# Patient Record
(demographics unavailable — no encounter records)

---

## 2017-02-08 NOTE — EKG REPORT
SEVERITY:- ABNORMAL ECG -

ATRIAL FIBRILLATION WITH RVR.

REPOLARIZATION ABNORMALITY, PROB RATE RELATED

:

Confirmed by: Tawanda Ramirez MD 08-Feb-2017 13:46:54

## 2017-02-08 NOTE — ER DOCUMENT REPORT
ED General





- General


Chief Complaint: Chest Pain


Stated Complaint: CHEST PAIN


TRAVEL OUTSIDE OF THE [place] IN LAST 30 DAYS: No





- HPI


Patient complains to provider of: chest pain palpitations weakness


Notes: 


Patient coming in for evaluation of chest pain weakness ongoing for last 2 

weeks.  Patient also complains of right knee swelling and pain.  Denies any 

trauma.  Patient states no history diabetes states history of hypertension.  

Denies any recent travel denies any recent antibiotics.  Upon triage patient 

was found to have a heart rate of 170.  Patient denies any dizziness or blurred 

vision feeling faint denies any syncopal episode recently





- Related Data


Allergies/Adverse Reactions: 


 





tramadol Allergy (Intermediate, Verified 02/08/17 13:42)


 AMS


Penicillins Allergy (Mild, Verified 02/08/17 13:42)


 rash


Sulfa (Sulfonamide Antibiotics) Allergy (Mild, Verified 02/08/17 13:42)


 rash


aspirin Allergy (Verified 02/08/17 13:42)


 








Home Medications: 


 Current Home Medications





Albuterol Sulfate [Ventolin HFA MDI 18 GM] 2 puff IH TIDP PRN 02/08/17 [History]


Alprazolam [Xanax] 1 mg PO TIDP PRN 02/08/17 [History]


Amitriptyline HCl [Elavil 25 mg Tablet] 50 mg PO QHS 02/08/17 [History]


Ammonium Lactate [Lac-Hydrin 12% Lotion 225Gm/Bottle] 1 applic TOP BIDP PRN 02/ 08/17 [History]


Brexpiprazole [Rexulti] 3 mg PO QHS 02/08/17 [History]


Clobetasol Propionate [Temovate 0.05% Cream 15 gm] 1 applic TOP BIDP PRN 02/08/ 17 [History]


Cyclobenzaprine HCl [Flexeril 10 mg Tablet] 10 mg PO TID 02/08/17 [History]


Desvenlafaxine Succinate [Pristiq  mg Tab.sr] 100 mg PO DAILY 02/08/17 [

History]


Dexlansoprazole [Dexilant 60 mg Capsule] 60 mg PO DAILY 02/08/17 [History]


Diphenhydramine HCl [Benadryl 25 mg Capsule] 50 mg PO QHS 02/08/17 [History]


Fluticasone/Salmeterol [Advair 250-50 Diskus 28 dose] 1 puff IH DAILY 02/08/17 [

History]


Levothyroxine Sodium [Synthroid 0.05 mg Tablet] 50 mcg PO DAILY 02/08/17 [

History]


Metformin HCl [Glucophage] 500 mg PO BID 02/08/17 [History]


Simvastatin [Zocor 40 mg Tablet] 40 mg PO QPM 02/08/17 [History]


Zolpidem Tartrate [Ambien] 10 mg PO QHS 02/08/17 [History]











Past Medical History





- Social History


Smoking Status: Unknown if Ever Smoked


Family History: CAD, CVA, DM, Hyperlipidemia, Hypertension, Malignancy, Thyroid 

Disfunction, Other - asthma


Patient has suicidal ideation: No


Patient has homicidal ideation: No





- Past Medical History


Cardiac Medical History: Reports: Hx Hypercholesterolemia


   Denies: Hx Coronary Artery Disease, Hx Heart Attack, Hx Hypertension


Pulmonary Medical History: Reports: Hx Asthma, Hx Bronchitis


   Denies: Hx COPD, Hx Pneumonia


Neurological Medical History: Denies: Hx Cerebrovascular Accident, Hx Seizures


Endocrine Medical History: Reports: Hx Diabetes Mellitus Type 2 - borderline, 

Hx Hypothyroidism


Renal/ Medical History: Denies: Hx Peritoneal Dialysis


GI Medical History: Reports: Hx Gastritis, Hx Gastroesophageal Reflux Disease


Musculoskeltal Medical History: Reports Hx Arthritis, Reports Hx 

Musculoskeletal Trauma


Psychiatric Medical History: Reports: Hx Anxiety, Hx Depression - anxiety, Hx 

Schizophrenia


Past Surgical History: Reports: Hx Cholecystectomy, Hx Tubal Ligation.  Denies: 

Hx Hysterectomy





- Immunizations


Immunizations up to date: Yes


Hx Diphtheria, Pertussis, Tetanus Vaccination: Yes





Review of Systems





- Review of Systems


Constitutional: No symptoms reported


EENT: No symptoms reported


Cardiovascular: Chest pain


Respiratory: Cough


Gastrointestinal: No symptoms reported


Genitourinary: No symptoms reported


Female Genitourinary: No symptoms reported


Musculoskeletal: No symptoms reported


Skin: No symptoms reported


Hematologic/Lymphatic: No symptoms reported


Neurological/Psychological: No symptoms reported


-: Yes All other systems reviewed and negative





Physical Exam





- Vital signs


Vitals: 


 











Pulse Ox


 


 98 


 


 02/08/17 13:05











Interpretation: Tachycardic





- General


General appearance: Appears well, Alert





- HEENT


Head: Normocephalic, Atraumatic


Eyes: Normal


Pupils: PERRL





- Respiratory


Respiratory status: No respiratory distress


Chest status: Nontender


Breath sounds: Normal


Chest palpation: Normal





- Cardiovascular


Rhythm: Irregularly irregular, Tachycardia


Heart sounds: Normal auscultation


Murmur: No





- Abdominal


Inspection: Normal


Distension: No distension


Bowel sounds: Normal


Tenderness: Nontender


Organomegaly: No organomegaly





- Back


Back: Normal, Nontender





- Extremities


General upper extremity: Normal inspection, Nontender, Normal color, Normal ROM

, Normal temperature


General lower extremity: Normal inspection, Tender - Right knee, Edema - Right 

knee, Normal color, Normal ROM, Normal temperature, Normal weight bearing.  No: 

Eliane's sign





- Neurological


Neuro grossly intact: Yes


Cognition: Normal


Orientation: AAOx4


Chloe Coma Scale Eye Opening: Spontaneous


[place] Coma Scale Verbal: Oriented


Chloe Coma Scale Motor: Obeys Commands


Chloe Coma Scale Total: 15


Speech: Normal


Motor strength normal: LUE, RUE, LLE, RLE


Sensory: Normal





- Psychological


Associated symptoms: Normal affect, Normal mood





- Skin


Skin Temperature: Warm


Skin Moisture: Dry


Skin Color: Normal





Course





- Re-evaluation


Re-evalutation: 





02/08/17 19:06


Patient coming in for evaluation of atrial evaluation.  Patient was started on 

Cardizem and did have a good response improve her A. fib with RVR.  Patient 

also underwent a CTA no PE.  Patient's lab work showed no acute etiology except 

for have a magnesium.  Patient will be referred to hospitals for further 

evaluation and admission.





- Vital Signs


Vital signs: 


 











Temp Pulse Resp BP Pulse Ox


 


 98.2 F      17   115/91 H  96 


 


 02/08/17 14:42     02/08/17 18:46  02/08/17 18:46  02/08/17 18:46














- Laboratory


Result Diagrams: 


 02/08/17 13:10





 02/08/17 13:10


Laboratory results interpreted by me: 


 











  02/08/17 02/08/17 02/08/17





  13:10 13:10 13:10


 


Lymphocytes % (Manual)  46 H  


 


BUN   6 L 


 


Glucose   185 H 


 


Magnesium   1.4 L 


 


Free T4    0.64 L














Critical Care Note





- Critical Care Note


Total time excluding time spent on procedures (mins): 35


Comments: 


Patient with A. fib RVR time spent managing drip





Discharge





- Discharge


Clinical Impression: 


 New onset a-fib, Hypomagnesemia





Chest pain


Qualifiers:


 Chest pain type: unspecified Qualified Code(s): R07.9 - Chest pain, unspecified





Right knee pain


Qualifiers:


 Chronicity: acute Qualified Code(s): M25.561 - Pain in right knee





Diabetes


Qualifiers:


 Diabetes mellitus type: type 2 Diabetes mellitus complication status: without 

complication Diabetes mellitus long term insulin use: without long term use 

Qualified Code(s): E11.9 - Type 2 diabetes mellitus without complications





Disposition: ADMITTED AS INPATIENT


Admitting Provider: Hospitalist - Mark


Unit Admitted: [facility]

## 2017-02-08 NOTE — PDOC CONSULTATION
Consultation


Consult Date: 17


Attending physician:: ROSA ELENA KELLY


Consult reason:: Atrial fibrillation, chest pain





History of Present Illness


Admission Date/PCP: 


  17 17:26





  SCOOTER REZA MD





Patient complains of: Palpitations and chest pains


History of Present Illness: 


EVERARDO GEORGE is a 50 year old female with several month history of 

palpitations/heart racing and associated chest and right arm pain.  Patient has 

also noted over the same period of time swelling in her legs and weight gain.  

In addition patient complains of several day history of right knee pain.  She 

has no history of injury to this area.  She has no history of arthritis or gout.





Patient describes the chest discomfort as sharp, central chest, radiating to 

the right arm.  The discomfort is rather constant.  There is no aggravating 

factor that she has noted.  There are no other associated symptoms such as 

nausea vomiting diaphoresis.  Patient does feel palpitations when she has chest 

pain.  The discomfort is acute in onset.  The discomfort has improved since 

hospitalization.





Patient was noted to be in atrial fibrillation with rapid ventricular response 

in the emergency department.  She has now converted to a sinus rhythm after 

being administer Cardizem.  She is currently on Cardizem drip at 10 mg per hour.





Patient denied any history of prior heart problems but does give strong family 

history of premature coronary artery disease.  Her mother apparently  in 

hospital at the age of 53 of sudden cardiac death.


Patient does give history of loud snoring, daytime fatigue and sleepiness.  

Patient also has difficulty falling asleep and staying asleep.





Past Medical History


Cardiac Medical History: Reports: Hyperlipidema


   Denies: Coronary Artery Disease, Myocardial Infarction, Hypertension


Pulmonary Medical History: Reports: Asthma, Bronchitis


   Denies: Chronic Obstructive Pulmonary Disease (COPD), Pneumonia


Neurological Medical History: 


   Denies: Seizures


Endocrine Medical History: Reports: Diabetes Mellitus Type 2 - borderline, 

Hypothyroidism


GI Medical History: Reports: Gastroesophageal Reflux Disease


Musculoskeltal Medical History: Reports: Arthritis


Psychiatric Medical History: Reports: Depression - anxiety


Hematology: 


   Denies: Anemia





Past Surgical History


Past Surgical History: Reports: Cholecystectomy, Tubal Ligation


   Denies: Hysterectomy





Social History


Information Source: Patient


Smoking Status: Unknown if Ever Smoked


Frequency of Alcohol Use: None


Hx Recreational Drug Use: No


Hx Prescription Drug Abuse: No





- Advance Directive


Resuscitation Status: Full Code


Surrogate healthcare decision maker:: 


Patient eldest son by the name of Angel





Family History


Family History: CAD, CVA, DM, Hyperlipidemia, Hypertension, Malignancy, Thyroid 

Disfunction, Other - asthma


Parental Family History Reviewed: Yes


Children Family History Reviewed: Yes


Sibling(s) Family History Reviewed.: Yes - Positive for premature CAD in the 

family





Medication/Allergy


Home Medications: 








Alprazolam [Xanax] 1 mg PO TIDP PRN 17 


Amitriptyline HCl [Elavil 25 mg Tablet] 50 mg PO QHS 17 


Ammonium Lactate [Lac-Hydrin 12% Lotion 225Gm/Bottle] 1 applic TOP BIDP PRN  


Brexpiprazole [Rexulti] 3 mg PO QHS 17 


Clobetasol Propionate [Temovate 0.05% Cream 15 gm] 1 applic TOP BIDP PRN  


Dexlansoprazole [Dexilant 60 mg Capsule] 60 mg PO DAILY 17 


Diphenhydramine HCl [Benadryl 25 mg Capsule] 50 mg PO QHS 17 


Fluticasone/Salmeterol [Advair 250-50 Diskus 28 dose] 1 puff IH BID 17 


Levothyroxine Sodium [Synthroid 0.05 mg Tablet] 50 mcg PO QAM 17 


Metformin HCl [Glucophage] 500 mg PO BID 17 


Simvastatin [Zocor 40 mg Tablet] 40 mg PO QPM 17 


Zolpidem Tartrate [Ambien] 10 mg PO QHS 17 


Desvenlafaxine Succinate [Pristiq  mg Tab.sr] 100 mg PO DAILY 17 








Allergies/Adverse Reactions: 


 





tramadol Allergy (Intermediate, Verified 17 13:42)


 AMS


Penicillins Allergy (Mild, Verified 17 13:42)


 rash


Sulfa (Sulfonamide Antibiotics) Allergy (Mild, Verified 17 13:42)


 rash


aspirin Allergy (Verified 17 13:42)


 











Review of Systems


Review of Systems: 


Please see history of present illness and past medical history as wall.


Constitutional: No fever or chills reported.


Head : No recent chronic headaches, recent head injury.


Eyes: No recent eye pain, diplopia, redness, discharge, acute visual changes.


Ears: No recent chronic ear pain, acute hearing loss, ear discharge.


Oral cavity: No recent  ulcerations, bleeding, oral cavity discomfort.


Neck: No recent acute neck pain reported.


Hematologic: No recent easy bruising or bleeding or hematologic malignancy 

reported.


Lymphatic: No recent lymphatic malignancy, chronic lymphadenopathy reported yet


Cardiovascular system review: See history of present illness.


Respiratory system review: No recent chronic cough, hemoptysis, blood clots in 

the lungs reported. Mild Shortness of breath on exertion


Gastrointestinal system review: Negative for any recent acute or chronic 

abdominal pain, hematemesis, melena, recent change in bowel habits.  


Genitourinary system review: No recent acute or chronic hematuria, flank pain, 

UTI etc. reported.


Skin system review: Negative for any recent abnormal bruising, no rash, no 

pruritus reported.


Neurologic: No prior history of strokes, mini strokes, seizure disorder.


Psychologic: No history of major psychosis or major depression reported.  

Patient gives history of minor depression


Musculoskeletal: Minor aches and pains reported.  No acute joint swelling 

reported.


Endocrine: No recent polyuria, polydipsia, recent heat or cold intolerance.





Physical Exam


Vital Signs: 


 











Temp Pulse Resp BP Pulse Ox


 


 98.2 F      17   115/91 H  96 


 


 17 14:42     17 18:46  17 18:46  17 18:46











Exam: 





GENERAL: well-nourished and in no acute distress.  Alert and oriented x3


HEAD: Atraumatic, normocephalic.


EYES: Pupils equal round and reactive to light, extraocular movements intact, 

sclera anicteric, conjunctiva are normal.


ENT: TMs normal, nares patent, oropharynx clear without exudates. Moist mucous 

membranes.  No oral ulcerations or bleeding gums noted


NECK: supple without lymphadenopathy.  Trachea is central.  No cervical or 

axillary lymphadenopathy noted.  Carotids are 2+, JVD WNL 


LUNGS: Respiration seems nonlabored, no significant accessory muscle action 

noted.  Breath sounds clear to auscultation bilaterally and equal. No wheezes 

rales or rhonchi.  No significant dullness noted on percussion.


CHEST: Palpation of the chest wall shows no significant chest wall tenderness 

or abnormalities.


HEART: Pinch NP, No PSH,  1/6 LARRY aortic area, 1/6 pan systolic murmur mitral 

area, no rubs, no gallops.


ABDOMEN: Soft, no significant tenderness appreciated, normoactive bowel sounds. 

No guarding, no rebound.  No rigidity noted . No masses appreciated.


EXTREMITIES: Pedal pulses are 1-2+, no calf tenderness noted. No clubbing or 

cyanosis.trace to 1+ pedal edema noted


NEUROLOGICAL: Focused neurological exam showed no significant neurologic 

deficit. Normal speech, no focal weakness appreciated. 


PSYCH: Normal mood, normal affect.  Judgment and insight within normal limits.


SKIN: No significant ecchymosis, rash, ulcerations or signs of pruritus noted.


MUSCULOSKELETAL EXAM: No significant joint swelling noted.  





Results


EKG Comments: 


Initial EKG showed atrial fibrillation with rapid ventricular response at 170 

bpm.  Subsequent EKG shows sinus rhythm and nonspecific T-wave inversion 

inferior and lateral chest leads.


Impressions: 


 





Chest X-Ray  17 12:56


IMPRESSION:  NO ACUTE RADIOGRAPHIC FINDING IN THE CHEST.


 








Knee X-Ray  17 12:58


IMPRESSION:  NEGATIVE STUDY OF THE RIGHT KNEE. NO EXPLANATION FOR PAIN.


 








Chest/Abdomen CTA  17 14:17


IMPRESSION:  No acute consolidations or pleural effusions are identified. NO 

PULMONARY EMBOLI.  Other findings as noted above


 














Assessment & Plan





- Diagnosis


(1) Paroxysmal atrial fibrillation


Is this a current diagnosis for this admission?: Yes





(2) Obesity


Qualifiers: 


     Obesity severity: unspecified obesity severity


Is this a current diagnosis for this admission?: Yes





(3) Sleep disorder breathing


Is this a current diagnosis for this admission?: Yes





(4) Chest pain


Qualifiers: 


     Chest pain type: unspecified     Qualified Code(s): R07.9 - Chest pain, 

unspecified  


Is this a current diagnosis for this admission?: Yes





(5) Diabetes


Qualifiers: 


     Diabetes mellitus type: type 2     Diabetes mellitus complication status: 

without complication     Diabetes mellitus long term insulin use: without long 

term use        Qualified Code(s): E11.9 - Type 2 diabetes mellitus without 

complications  


Is this a current diagnosis for this admission?: Yes








- Notes


Notes: 


Paroxysmal atrial fibrillation: Patient gives history of intermittent irregular 

heartbeat.  This is most likely related to obesity, sleep apnea syndrome.  

Discussed association of both obesity and sleep apnea syndrome with atrial 

fibrillation.  Patient encouraged in weight loss.  Patient will also benefit 

from scheduling a sleep study.  Do not feel chronic long-term anticoagulation 

is needed at this point but patient will be considered for ablation therapy 

should patient has recurrence of atrial fibrillation.  Patient New score is 

noted to be 1.


Obesity Discussed adverse effect of overweight/obesity on cardiovascular event 

rate, sleep apnea, diabetes and hypertension et cetera.  Patient has been 

recommended weight loss.


Sleep disordered breathing: Based on patient's symptoms, oropharyngeal exam, 

body habitus, comorbid diagnosis etc., there is high probability of underlying 

sleep apnea syndrome.  Evaluation is recommended for sleep apnea as treatment 

of this condition if found is likely to benefit patient and reduce patient's 

future cardiovascular risk.


Chest pain: Patient has some typical and atypical features of chest pain.  

Cardiac enzymes so far has been negative.  Electrocardiogram did not show any 

definitive ST segment changes.  Multiple differential diagnoses exist in this 

patient. In descending order of probability this includes underlying coronary 

artery disease, gastroesophageal reflux, musculoskeletal pain, referred pain 

from elsewhere, anxiety panic disorder etc.Patient has significant cardiac risk 

factors, which indicates that there is a intermediate probability of chest 

discomfort coming from underlying CAD.  Feel that it would need to be evaluated 

further.  Discussed evaluation to assess this.  In this regard risk benefits of 

nuclear stress test and other alternative processes were discussed in detail.  

The patient prefers to undergo nuclear stress test.  The small risk of radiation

, myocardial infarction, death, cardiac arrhythmias, respiratory distress etc. 

were discussed.  Patient understood the risks and gave informed consent.  

Nuclear stress test was therefore scheduled.  For risk evaluation, patient is 

also being scheduled for a 2-D echocardiogram.  Patient questions were answered.


Diabetes:Recommend good control of blood sugar.  However should avoid any 

hypoglycemia.  Patient being expertly managed by primary care M.D.





- Time


Time Spent: 30 to 50 Minutes - CODE STATUS was discussed, patient remains full 

code.  Surrogate decision-maker patient's eldest son.  Multiple medical 

problems were addressed.More than 50% of the time spent coordinating care, 

discussing management plans with involved caregivers.  Management plans 

discussed with involved personnels.  Medical decision making was of moderate 

complexity.

## 2017-02-08 NOTE — PDOC H&P
History of Present Illness


Admission Date/PCP: 


  02/08/17 16:50





  SCOOTER REZA MD





Patient complains of: Chest pain


History of Present Illness: 


EVERARDO GEORGE is a 50 year old female with several month history of 

palpitations/heart racing and associated chest and right arm pain.  Patient has 

also noted over the same period of time swelling in her legs and weight gain.  

Patient drinks excessive amounts of fluids and seems to fixate on this when I 

talk about a fluid restriction.  In addition patient complains of several day 

history of right knee pain.  She has no injury to this area.  She has no 

history of arthritis or gout.





Medications have not been verified at the time of this dictation.  Patient 

states that she gets medications filled at [facility] pharmacy on [facility].  

She states that she takes metformin, "a fluid pill", and other medications that 

she does not recall.








Patient was noted to be in atrial fibrillation with rapid ventricular response 

in the emergency department.  She has now converted to a sinus rhythm after 

being administer Cardizem.  She is currently on Cardizem drip at 10 mg per hour.





Past Medical History


Cardiac Medical History: Reports: Hyperlipidema


   Denies: Coronary Artery Disease, Myocardial Infarction, Hypertension


Pulmonary Medical History: Reports: Asthma, Bronchitis


   Denies: Chronic Obstructive Pulmonary Disease (COPD), Pneumonia


Neurological Medical History: 


   Denies: Seizures


Endocrine Medical History: Reports: Diabetes Mellitus Type 2 - borderline, 

Hypothyroidism


GI Medical History: Reports: Gastroesophageal Reflux Disease


Musculoskeltal Medical History: Reports: Arthritis


Psychiatric Medical History: Reports: Depression - anxiety


Hematology: 


   Denies: Anemia





Past Surgical History


Past Surgical History: Reports: Cholecystectomy, Tubal Ligation


   Denies: Hysterectomy





Social History


Information Source: Patient


Smoking Status: Unknown if Ever Smoked


Frequency of Alcohol Use: None


Hx Recreational Drug Use: No


Hx Prescription Drug Abuse: No





- Advance Directive


Resuscitation Status: Full Code





Family History


Family History: CAD, CVA, DM, Hyperlipidemia, Hypertension, Malignancy, Thyroid 

Disfunction, Other - asthma


Parental Family History Reviewed: Yes


Children Family History Reviewed: Yes


Sibling(s) Family History Reviewed.: Yes





Medication/Allergy


Home Medications: 








Albuterol Sulfate [Ventolin HFA MDI 18 GM] 2 puff IH TIDP PRN 02/08/17 


Alprazolam [Xanax] 1 mg PO TIDP PRN 02/08/17 


Amitriptyline HCl [Elavil 25 mg Tablet] 50 mg PO QHS 02/08/17 


Ammonium Lactate [Lac-Hydrin 12% Lotion 225Gm/Bottle] 1 applic TOP BIDP PRN 02/ 08/17 


Brexpiprazole [Rexulti] 3 mg PO QHS 02/08/17 


Clobetasol Propionate [Temovate 0.05% Cream 15 gm] 1 applic TOP BIDP PRN 02/08/ 17 


Cyclobenzaprine HCl [Flexeril 10 mg Tablet] 10 mg PO TID 02/08/17 


Desvenlafaxine Succinate [Pristiq  mg Tab.sr] 100 mg PO DAILY 02/08/17 


Dexlansoprazole [Dexilant 60 mg Capsule] 60 mg PO DAILY 02/08/17 


Diphenhydramine HCl [Benadryl 25 mg Capsule] 50 mg PO QHS 02/08/17 


Fluticasone/Salmeterol [Advair 250-50 Diskus 28 dose] 1 puff IH DAILY 02/08/17 


Levothyroxine Sodium [Synthroid 0.05 mg Tablet] 50 mcg PO DAILY 02/08/17 


Metformin HCl [Glucophage] 500 mg PO BID 02/08/17 


Simvastatin [Zocor 40 mg Tablet] 40 mg PO QPM 02/08/17 


Zolpidem Tartrate [Ambien] 10 mg PO QHS 02/08/17 








Allergies/Adverse Reactions: 


 





tramadol Allergy (Intermediate, Verified 02/08/17 13:42)


 AMS


Penicillins Allergy (Mild, Verified 02/08/17 13:42)


 rash


Sulfa (Sulfonamide Antibiotics) Allergy (Mild, Verified 02/08/17 13:42)


 rash


aspirin Allergy (Verified 02/08/17 13:42)


 











Review of Systems


Constitutional: PRESENT: fatigue, weight gain.  ABSENT: chills, fever(s), 

headache(s), weight loss


Eyes: ABSENT: visual disturbances


Ears: ABSENT: hearing changes


Cardiovascular: PRESENT: chest pain, edema.  ABSENT: dyspnea on exertion, 

orthropnea, palpitations


Respiratory: ABSENT: cough, hemoptysis


Gastrointestinal: ABSENT: abdominal pain, constipation, diarrhea, hematemesis, 

hematochezia, nausea, vomiting


Genitourinary: ABSENT: dysuria, hematuria


Musculoskeletal: ABSENT: joint swelling


Integumentary: ABSENT: rash, wounds


Neurological: ABSENT: abnormal gait, abnormal speech, confusion, dizziness, 

focal weakness, syncope


Psychiatric: ABSENT: anxiety, depression, homidical ideation, suicidal ideation


Endocrine: PRESENT: polydipsia.  ABSENT: cold intolerance, heat intolerance, 

polyuria


Hematologic/Lymphatic: ABSENT: easy bleeding, easy bruising





Physical Exam


Vital Signs: 


 











Temp Pulse Resp BP Pulse Ox


 


 98.2 F      21 H  138/80 H  99 


 


 02/08/17 14:42     02/08/17 16:31  02/08/17 16:31  02/08/17 16:31








PHYSICAL EXAM:





GENERAL: Appears well, no acute distress            


HEENT: Normocephalic, no scleral icterus, conjunctiva clear, EOEM intact, PERRLA

, moist mucous membranes, edentulous            


NECK: trachea midline, no thyromegally            


RESPIRATORY: Clear to auscultation, no wheezes/rhonchi


CARDIAC: Regular rate and rhythm, no murmur/pippa/rub         


ABDOMEN: Soft, no distension, no tenderness, no guarding, normal bowel sounds, 

negative Saldivar sign         


RECTAL: deferred


: deferred


EXTREMITIES: 1+ bilateral lower extremity edema


MUSCULOSKELETAL: No joint swelling or deformity


VASCULAR: normal peripheral pulses


NEUROLOGIC: Alert, oriented to person/place/time, normal speech, cranial nerves 

grossly intact, 5/5 strength in all extremities, tactile sensation intact in 

all extremities


SKIN: No rash, no wounds, no worrisome skin lesions


PSYCHIATRIC: Normal mood, unusual affect





Results


Laboratory Results: 


 Labs- All tests 24 hr











  02/08/17 02/08/17 02/08/17





  13:10 13:10 13:10


 


WBC  7.9  


 


RBC  4.30  


 


Hgb  13.2  


 


Hct  40.9  


 


MCV  95  


 


MCH  30.8  


 


MCHC  32.4  


 


RDW  12.9  


 


Plt Count  338  


 


Total Counted  100  


 


Seg Neutrophils %  Not Reportable  


 


Seg Neuts % (Manual)  45  


 


Lymphocytes %  Not Reportable  


 


Lymphocytes % (Manual)  46 H  


 


Atypical Lymphs %  1  


 


Monocytes %  Not Reportable  


 


Monocytes % (Manual)  8  


 


Eosinophils %  Not Reportable  


 


Eosinophils % (Manual)  0  


 


Basophils %  Not Reportable  


 


Basophils % (Manual)  0  


 


Absolute Neutrophils  Not Reportable  


 


Abs Neuts (Manual)  3.6  


 


Absolute Lymphocytes  Not Reportable  


 


Abs Lymphs (Manual)  3.7  


 


Absolute Monocytes  Not Reportable  


 


Abs Monocytes (Manual)  0.6  


 


Absolute Eosinophils  Not Reportable  


 


Absolute Eos (Manual)  0.0  


 


Absolute Basophils  Not Reportable  


 


Abs Basophils (Manual)  0.0  


 


Platelet Comment  ADEQUATE  


 


Polychromasia  SLIGHT  


 


Hypochromasia  SLIGHT  


 


PT   12.0 


 


INR   0.86 


 


Sodium    139.9


 


Potassium    3.9


 


Chloride    103


 


Carbon Dioxide    23


 


Anion Gap    14


 


BUN    6 L


 


Creatinine    0.87


 


Est GFR ( Amer)    > 60


 


Est GFR (Non-Af Amer)    > 60


 


Glucose    185 H


 


Calcium    9.5


 


Magnesium    1.4 L


 


Total Bilirubin    0.5


 


Direct Bilirubin    0.0


 


AST    31


 


ALT    28


 


Alkaline Phosphatase    105


 


Creatine Kinase    110


 


CK-MB (CK-2)   


 


Troponin I   


 


Total Protein    7.1


 


Albumin    3.9


 


TSH   


 


Free T4   


 


Urine Color   


 


Urine Appearance   


 


Urine pH   


 


Ur Specific Gravity   


 


Urine Protein   


 


Urine Glucose (UA)   


 


Urine Ketones   


 


Urine Blood   


 


Urine Nitrite   


 


Urine Bilirubin   


 


Urine Urobilinogen   


 


Ur Leukocyte Esterase   


 


Urine WBC (Auto)   


 


Urine Bacteria (Auto)   


 


Squamous Epi Cells Auto   


 


Urine Ascorbic Acid   


 


Urine Opiates Screen   


 


Urine Methadone Screen   


 


Ur Barbiturates Screen   


 


Ur Phencyclidine Scrn   


 


Ur Amphetamines Screen   


 


U Benzodiazepines Scrn   


 


Urine Cocaine Screen   


 


U Marijuana (THC) Screen   














  02/08/17 02/08/17 02/08/17





  13:10 13:10 16:00


 


WBC   


 


RBC   


 


Hgb   


 


Hct   


 


MCV   


 


MCH   


 


MCHC   


 


RDW   


 


Plt Count   


 


Total Counted   


 


Seg Neutrophils %   


 


Seg Neuts % (Manual)   


 


Lymphocytes %   


 


Lymphocytes % (Manual)   


 


Atypical Lymphs %   


 


Monocytes %   


 


Monocytes % (Manual)   


 


Eosinophils %   


 


Eosinophils % (Manual)   


 


Basophils %   


 


Basophils % (Manual)   


 


Absolute Neutrophils   


 


Abs Neuts (Manual)   


 


Absolute Lymphocytes   


 


Abs Lymphs (Manual)   


 


Absolute Monocytes   


 


Abs Monocytes (Manual)   


 


Absolute Eosinophils   


 


Absolute Eos (Manual)   


 


Absolute Basophils   


 


Abs Basophils (Manual)   


 


Platelet Comment   


 


Polychromasia   


 


Hypochromasia   


 


PT   


 


INR   


 


Sodium   


 


Potassium   


 


Chloride   


 


Carbon Dioxide   


 


Anion Gap   


 


BUN   


 


Creatinine   


 


Est GFR ( Amer)   


 


Est GFR (Non-Af Amer)   


 


Glucose   


 


Calcium   


 


Magnesium   


 


Total Bilirubin   


 


Direct Bilirubin   


 


AST   


 


ALT   


 


Alkaline Phosphatase   


 


Creatine Kinase   


 


CK-MB (CK-2)  0.56  


 


Troponin I  < 0.012  


 


Total Protein   


 


Albumin   


 


TSH   4.29 


 


Free T4   0.64 L 


 


Urine Color    STRAW


 


Urine Appearance    CLEAR


 


Urine pH    6.0


 


Ur Specific Gravity    1.002


 


Urine Protein    NEGATIVE


 


Urine Glucose (UA)    NEGATIVE


 


Urine Ketones    NEGATIVE


 


Urine Blood    NEGATIVE


 


Urine Nitrite    NEGATIVE


 


Urine Bilirubin    NEGATIVE


 


Urine Urobilinogen    NEGATIVE


 


Ur Leukocyte Esterase    NEGATIVE


 


Urine WBC (Auto)    1


 


Urine Bacteria (Auto)    TRACE


 


Squamous Epi Cells Auto    3


 


Urine Ascorbic Acid    NEGATIVE


 


Urine Opiates Screen   


 


Urine Methadone Screen   


 


Ur Barbiturates Screen   


 


Ur Phencyclidine Scrn   


 


Ur Amphetamines Screen   


 


U Benzodiazepines Scrn   


 


Urine Cocaine Screen   


 


U Marijuana (THC) Screen   














  02/08/17





  16:00


 


WBC 


 


RBC 


 


Hgb 


 


Hct 


 


MCV 


 


MCH 


 


MCHC 


 


RDW 


 


Plt Count 


 


Total Counted 


 


Seg Neutrophils % 


 


Seg Neuts % (Manual) 


 


Lymphocytes % 


 


Lymphocytes % (Manual) 


 


Atypical Lymphs % 


 


Monocytes % 


 


Monocytes % (Manual) 


 


Eosinophils % 


 


Eosinophils % (Manual) 


 


Basophils % 


 


Basophils % (Manual) 


 


Absolute Neutrophils 


 


Abs Neuts (Manual) 


 


Absolute Lymphocytes 


 


Abs Lymphs (Manual) 


 


Absolute Monocytes 


 


Abs Monocytes (Manual) 


 


Absolute Eosinophils 


 


Absolute Eos (Manual) 


 


Absolute Basophils 


 


Abs Basophils (Manual) 


 


Platelet Comment 


 


Polychromasia 


 


Hypochromasia 


 


PT 


 


INR 


 


Sodium 


 


Potassium 


 


Chloride 


 


Carbon Dioxide 


 


Anion Gap 


 


BUN 


 


Creatinine 


 


Est GFR ( Amer) 


 


Est GFR (Non-Af Amer) 


 


Glucose 


 


Calcium 


 


Magnesium 


 


Total Bilirubin 


 


Direct Bilirubin 


 


AST 


 


ALT 


 


Alkaline Phosphatase 


 


Creatine Kinase 


 


CK-MB (CK-2) 


 


Troponin I 


 


Total Protein 


 


Albumin 


 


TSH 


 


Free T4 


 


Urine Color 


 


Urine Appearance 


 


Urine pH 


 


Ur Specific Gravity 


 


Urine Protein 


 


Urine Glucose (UA) 


 


Urine Ketones 


 


Urine Blood 


 


Urine Nitrite 


 


Urine Bilirubin 


 


Urine Urobilinogen 


 


Ur Leukocyte Esterase 


 


Urine WBC (Auto) 


 


Urine Bacteria (Auto) 


 


Squamous Epi Cells Auto 


 


Urine Ascorbic Acid 


 


Urine Opiates Screen  NEGATIVE


 


Urine Methadone Screen  NEGATIVE


 


Ur Barbiturates Screen  NEGATIVE


 


Ur Phencyclidine Scrn  NEGATIVE


 


Ur Amphetamines Screen  NEGATIVE


 


U Benzodiazepines Scrn  UNCONFIRMED POSITIVE


 


Urine Cocaine Screen  NEGATIVE


 


U Marijuana (THC) Screen  NEGATIVE











EKG Comments: 


Initial EKG showed atrial fibrillation with rapid ventricular response.  Second 

EKG shows sinus rhythm.


Impressions: 


 





Chest X-Ray  02/08/17 12:56


IMPRESSION:  NO ACUTE RADIOGRAPHIC FINDING IN THE CHEST.


 








Knee X-Ray  02/08/17 12:58


IMPRESSION:  NEGATIVE STUDY OF THE RIGHT KNEE. NO EXPLANATION FOR PAIN.


 








Chest/Abdomen CTA  02/08/17 14:17


IMPRESSION:  No acute consolidations or pleural effusions are identified. NO 

PULMONARY EMBOLI.  Other findings as noted above


 














Assessment & Plan





- Diagnosis


(1) New onset a-fib


Is this a current diagnosis for this admission?: YesPlan: 





Admit to the hospital on telemetry monitoring.  Start Toprol-XL 25 mg twice 

daily.  Discontinue Cardizem drip now the patient is in sinus rhythm.  Check 

echocardiogram.  Serial cardiac enzymes to rule out MI.  CTA of chest negative 

for pulmonary embolism.  Stress test in the morning.  Consult Dr. Eisenberg of 

cardiology.  Continue Lovenox initiated in the emergency department.








(2) Chest pain


Qualifiers: 


     Chest pain type: unspecified     Qualified Code(s): R07.9 - Chest pain, 

unspecified  


Is this a current diagnosis for this admission?: YesPlan: 


Serial cardiac enzymes to rule out MI.  Stress test in the morning.  Consult 

cardiology.








(3) Diabetes


Qualifiers: 


     Diabetes mellitus type: type 2     Diabetes mellitus complication status: 

without complication     Diabetes mellitus long term insulin use: without long 

term use        Qualified Code(s): E11.9 - Type 2 diabetes mellitus without 

complications  


Is this a current diagnosis for this admission?: YesPlan: 


Verify home dose of metformin and resume.  Sliding scale insulin coverage.








(4) Hypothyroidism





Is this a current diagnosis for this admission?: YesPlan: 


Verify home medication.








(5) Edema





Is this a current diagnosis for this admission?: YesPlan: 


Check proBNP level and echocardiogram.  Start Lasix 20 mg.  Start 1500 mL fluid 

restriction.








(6) Right knee pain


Qualifiers: 


     Chronicity: acute        Qualified Code(s): M25.561 - Pain in right knee  


Is this a current diagnosis for this admission?: YesPlan: 


X-ray negative.  Exam unremarkable with no inflammatory findings.








(7) Schizoaffective disorder





Is this a current diagnosis for this admission?: Yes





(8) Anxiety


Is this a current diagnosis for this admission?: Yes





(9) Hypomagnesemia


Is this a current diagnosis for this admission?: Yes








- Time


Time Spent: Greater than 70 Minutes





- Inpatient Certification


Based on my medical assessment, after consideration of the patient's 

comorbidities, presenting symptoms, or acuity I expect that the services needed 

warrant INPATIENT care.: Yes


I certify that my determination is in accordance with my understanding of 

Medicare's requirements for reasonable and necessary INPATIENT services [42 CFR 

412.3e].: Yes


Medical Necessity: Need For Continuous Telemetry Monitoring

## 2017-02-09 NOTE — XCELERA REPORT
[address] [address]

                             Tel: [phone]

                             Fax: [phone]



                    Transthoracic Echocardiogram Report

____________________________________________________________________________



Name: EVERARDO GEORGE

MRN: [id number]                Age: 50 yrs

Gender: Female                 : 1966

Patient Status: Inpatient      Patient Location: [id number]\S\324\S\A

Account #: [id number]

Study Date: 2017 08:56 AM

____________________________________________________________________________



Height: 63 in        Weight: 216 lb        BSA: 2.0 m2



____________________________________________________________________________

Procedure: A complete two-dimensional transthoracic echocardiogram was

performed (2D, M-mode, spectral and color flow Doppler). The study was

technically adequate with some images being suboptimal in quality.

Reason For Study: [email or web address] MABRY





Ordering Physician: ROSA ELENA KELLY

Performed By: Shobha Duncan



____________________________________________________________________________



Interpretation Summary

The left ventricular ejection fraction is within normal limits.

There is borderline concentric left ventricular hypertrophy.

The left ventricle is grossly normal size.

LV diastolic function could not be adequately assessed.

No regional wall motion abnormalities noted.

The right ventricular systolic function is normal.

The left atrium is mildly dilated.

The right atrium is normal.

There is a trace amount of mitral regurgitation

There is no mitral valve stenosis.

No aortic regurgitation is present.

There is no aortic valve stenosis

There is a trace or physiologic amount of tricuspid regurgitation

Right ventricular systolic pressure is at the upper limits of normal

The aortic root is not well visualized but is probably normal size.

The inferior vena cava appeared normal and decreased > 50% with respiration

(RAP 5-10 mmHg)

There is no pericardial effusion.



____________________________________________________________________________



MMode/2D Measurements \T\ Calculations

RVDd: 3.0 cm  LVIDd: 4.5 cm   FS: 36.7 %           Ao root diam: 2.7 cm

IVSd: 1.0 cm  LVIDs: 2.9 cm   EDV(Teich): 94.1 ml

              LVPWd: 0.98 cm  ESV(Teich): 31.4 ml  Ao root area: 5.8 cm2

                              EF(Teich): 66.7 %    LA dimension: 4.0 cm



Doppler Measurements \T\ Calculations

MV E max emir:      MV P1/2t max emir:    Ao V2 max:        LV V1 max P.9 cm/sec        83.9 cm/sec          105.7 cm/sec      3.2 mmHg

MV A max emir:      MV P1/2t: 58.4 msec  Ao max PG:        LV V1 max:

59.2 cm/sec                             4.5 mmHg          88.8 cm/sec

MV E/A: 1.4        MVA(P1/2t): 3.8 cm2

                   MV dec slope:

                   420.5 cm/sec2

                   MV dec time: 0.20 sec



        _____________________________________________________________

PA V2 max:         TR max emir:

81.9 cm/sec        270.1 cm/sec

PA max PG:         TR max P.2 mmHg

2.7 mmHg

____________________________________________________________________________



Left Ventricle

The left ventricle is grossly normal size. There is borderline concentric

left ventricular hypertrophy. The left ventricular ejection fraction is

within normal limits. LV diastolic function could not be adequately

assessed. No regional wall motion abnormalities noted.



Right Ventricle

The right ventricle is grossly normal size. The right ventricular free wall

is thin. The right ventricular systolic function is normal.



Atria

The right atrium is normal. The left atrium is mildly dilated.

Interarterial septum not well visualized and not well dopplered. Cannot

comment on ASD/PFO presence.



Mitral Valve

The mitral valve is grossly normal. There is no mitral valve stenosis.

There is a trace amount of mitral regurgitation.





Aortic Valve

The aortic valve is grossly normal. There is no aortic valve stenosis. No

aortic regurgitation is present.



Tricuspid Valve

The tricuspid valve is not well visualized, but is grossly normal. There is

no tricuspid stenosis. There is a trace or physiologic amount of tricuspid

regurgitation. Right ventricular systolic pressure is at the upper limits

of normal.



Pulmonic Valve

The pulmonic valve is not well visualized.



Great Vessels

The aortic root is not well visualized but is probably normal size. The

inferior vena cava appeared normal and decreased > 50% with respiration

(RAP 5-10 mmHg).



Effusions

There is no pericardial effusion.





____________________________________________________________________________

Electronically signed by:      Reuben Eisenberg      on 2017 06:59 PM



CC: ROSA ELENA KELLY

>

Reuben Eisenberg

## 2017-02-09 NOTE — PDOC PROGRESS REPORT
Subjective


Progress Note for:: 02/09/17


Subjective:: 


Patient seems to be doing better with significant improvement.  Pt is denying 

any chest arm or neck discomfort.  Patient denying any PND, orthopnea.  Patient 

denied any sustained palpitations, dizziness, syncope, near syncope.  Patient 

denying any fever chills.  Patient denying any other significant discomfort.  





Patient is maintaining sinus rhythm.





Review of systems: Rest review of systems negative.





Medications: Medications have been reviewed.





Physical Exam


Vital Signs: 


 











Temp Pulse Resp BP Pulse Ox


 


 98.4 F   91   18   135/74 H  98 


 


 02/09/17 16:27  02/09/17 16:27  02/09/17 16:27  02/09/17 16:27  02/09/17 16:27








 Intake & Output











 02/08/17 02/09/17 02/10/17





 06:59 06:59 06:59


 


Intake Total  139 640


 


Output Total  400 400


 


Balance  -261 240


 


Weight  103.4 kg 











Exam: 





GENERAL: well-nourished and in no acute distress.  Alert and oriented x3


HEAD: Atraumatic, normocephalic.


EYES: Pupils equal round and reactive to light, extraocular movements intact, 

sclera anicteric, conjunctiva are normal.


ENT: TMs normal, nares patent, oropharynx clear without exudates. Moist mucous 

membranes.  No oral ulcerations or bleeding gums noted


NECK: supple without lymphadenopathy.  Trachea is central.  No cervical or 

axillary lymphadenopathy noted.  Carotids are 2+, JVD WNL 


LUNGS: Respiration seems nonlabored, no significant accessory muscle action 

noted.  Breath sounds clear to auscultation bilaterally and equal. No wheezes 

rales or rhonchi.  No significant dullness noted on percussion.


CHEST: Palpation of the chest wall shows no significant chest wall tenderness 

or abnormalities.


HEART: [place] NP, No PSH,  1/6 LARRY aortic area, 1/6 pan systolic murmur mitral 

area, no rubs, no gallops.


ABDOMEN: Soft, no significant tenderness appreciated, normoactive bowel sounds. 

No guarding, no rebound.  No rigidity noted . No masses appreciated.


EXTREMITIES: Pedal pulses are 1-2+, no calf tenderness noted. No clubbing or 

cyanosis.trace to 1+ pedal edema noted


NEUROLOGICAL: Focused neurological exam showed no significant neurologic 

deficit. Normal speech, no focal weakness appreciated. 


PSYCH: Normal mood, normal affect.  Judgment and insight within normal limits.


SKIN: No significant ecchymosis, rash, ulcerations or signs of pruritus noted.


MUSCULOSKELETAL EXAM: No significant joint swelling noted.  





Results


Laboratory Results: 


 





 02/09/17 07:38 





 02/09/17 10:05 





 











  02/09/17 02/09/17 02/09/17





  07:38 07:38 10:05


 


WBC   7.0 


 


RBC   3.96 


 


Hgb   12.5 


 


Hct   37.9 


 


MCV   96 


 


MCH   31.5 


 


MCHC   32.9 


 


RDW   13.1 


 


Plt Count   287 


 


Seg Neutrophils %   53.1 


 


Lymphocytes %   39.5 


 


Monocytes %   6.3 


 


Eosinophils %   0.4 


 


Basophils %   0.7 


 


Absolute Neutrophils   3.7 


 


Absolute Lymphocytes   2.8 


 


Absolute Monocytes   0.4 


 


Absolute Eosinophils   0.0 


 


Absolute Basophils   0.1 


 


Sodium  Cancelled   141.4


 


Potassium  Cancelled   4.5


 


Chloride  Cancelled   103


 


Carbon Dioxide  Cancelled   25


 


Anion Gap  Cancelled   13


 


BUN  Cancelled   6 L


 


Creatinine  Cancelled   0.85


 


Est GFR ( Amer)  Cancelled   > 60


 


Est GFR (Non-Af Amer)  Cancelled   > 60


 


Glucose  Cancelled   150 H


 


Calcium  Cancelled   10.2


 


Magnesium  Cancelled   1.7


 


Triglycerides  Cancelled   344 H


 


Cholesterol  Cancelled   200.31 H


 


LDL Cholesterol Direct  Cancelled   81


 


VLDL Cholesterol  Cancelled   68.8 H


 


HDL Cholesterol  Cancelled   56








 











  02/08/17 02/08/17 02/09/17





  19:00 19:00 01:07


 


Creatine Kinase  111   108


 


CK-MB (CK-2)   0.70 


 


Troponin I   < 0.012 














  02/09/17 02/09/17 02/09/17





  01:07 05:17 07:38


 


Creatine Kinase    Cancelled


 


CK-MB (CK-2)  0.70  


 


Troponin I  < 0.012  < 0.012 














  02/09/17 02/09/17 02/09/17





  07:38 10:05 10:05


 


Creatine Kinase   137 H 


 


CK-MB (CK-2)  Cancelled   0.93


 


Troponin I  Cancelled   < 0.012











EKG Comments: 


Sinus rhythm with minor nonspecific T wave changes


Impressions: 


 





Chest X-Ray  02/08/17 12:56


IMPRESSION:  NO ACUTE RADIOGRAPHIC FINDING IN THE CHEST.


 








Knee X-Ray  02/08/17 12:58


IMPRESSION:  NEGATIVE STUDY OF THE RIGHT KNEE. NO EXPLANATION FOR PAIN.


 








Chest/Abdomen CTA  02/08/17 14:17


IMPRESSION:  No acute consolidations or pleural effusions are identified. NO 

PULMONARY EMBOLI.  Other findings as noted above


 














Assessment & Plan





- Diagnosis


(1) Paroxysmal atrial fibrillation


Is this a current diagnosis for this admission?: Yes





(2) Obesity


Qualifiers: 


     Obesity severity: unspecified obesity severity


Is this a current diagnosis for this admission?: Yes





(3) Sleep disorder breathing


Is this a current diagnosis for this admission?: Yes





(4) Chest pain


Qualifiers: 


     Chest pain type: unspecified     Qualified Code(s): R07.9 - Chest pain, 

unspecified  


Is this a current diagnosis for this admission?: Yes





(5) Diabetes


Qualifiers: 


     Diabetes mellitus type: type 2     Diabetes mellitus complication status: 

without complication     Diabetes mellitus long term insulin use: without long 

term use        Qualified Code(s): E11.9 - Type 2 diabetes mellitus without 

complications  


Is this a current diagnosis for this admission?: Yes








- Notes


Notes: 


Paroxysmal atrial fibrillation: Patient advised weight loss and to pursue a 

sleep study.  Discussed association of obesity and sleep apnea with atrial 

fibrillation.  Patient's chads score is 1.  Discussed anticoagulation.  It may 

be worthwhile to consider her for one month of chronic anticoagulation and then 

decide whether to continue it or not.  This was discussed with hospitalist.


Obesity:Discussed adverse effect of overweight/obesity on cardiovascular event 

rate, sleep apnea, diabetes and hypertension et cetera.  Patient has been 

recommended weight loss.  Patient advised in weight loss.  In this regard 

portion control, substitution, calorie restriction and regular exercise plan 

discussed.  Patient informed that I would be happy to help for outpatient 

management of weight loss.  Risk associated with being overweight and obesity 

discussed.  This included both mechanical and metabolic complications.


Sleep disorder: This is suspect that based on patient's symptoms of snoring, 

daytime fatigue and somnolence.  Patient also has difficulty falling asleep and 

staying asleep.  Patient has obesity and oropharyngeal exams suggest high 

probability of underlying sleep apnea syndrome.  Have discussed this with the 

patient.  Discussed increased risk of cardiovascular event rate, 

cerebrovascular accident, cardiac arrhythmias, uncontrolled hypertension etc. 

as being associated with untreated sleep apnea.  Discussed that we'll be happy 

to schedule this as an outpatient.  Patient has been recommended weight loss, 

sleep hygiene.


Chest pain: Patient claims chest pain is improved.  This was evaluated with a 

nuclear stress test.  Nuclear stress test was negative for any significant 

areas of ischemia or any significant areas of scar.  The nuclear stress test is 

felt to be relatively low risk.  Patient informed that occasionally single-

vessel disease and balanced ischemia could be missed.  Patient advised 

aggressive risk factor modification and medical therapy.  Patient informed that 

further evaluation may become necessary if symptoms worsens or there is a 

development of new symptoms indicative of angina or angina equivalent symptom.


Diabetes:Recommend good control of blood sugar.  However should avoid any 

hypoglycemia.  Patient being expertly managed by primary care M.D.





- Time


Time with patient: Greater than 35 minutes - CODE STATUS was discussed, patient 

remains full code.  Surrogate decision-maker unchanged.  Multiple medical 

problems were addressed.More than 50% of the time spent coordinating care, 

discussing management plans with involved caregivers.  Management plans 

discussed with involved personnels.  Medical decision making was of moderate 

complexity.Patient was seen multiple times.  Total time exceeds 40 minutes. In 

the morning nuclear stress test procedure, risks benefits, alternatives were 

discussed.  Patient seen during the stress test.  Patient also seen after 

stress test when results were discussed with the patient in detail.  Patient's 

questions were answered.  Nuclear stress test results were discussed with the 

patient.  Patient was informed that no definitive evidence of pharmacologic 

stress-induced ischemia noted.  No definite fixed defects were noted.  Patient 

informed that occasionally significant single vessel disease or balanced 

ischemia could be missed.  However based on the current study results, would 

recommend aggressive risk factor modification and medical therapy.  It may also 

be worthwhile to consider evaluation or empiric management of other causes of 

chest pain.  Should no other cause be found and if persistent in having chest 

pain, then cardiac catheterization should be considered.  Right now, 

recommendations are for aggressive risk factor modification and medical 

management.

## 2017-02-09 NOTE — PDOC DISCHARGE SUMMARY
General





- Admit/Disc Date/PCP


Admission Date/Primary Care Provider: 


  02/08/17 17:26





  SCOOTER REZA MD





Discharge Date: 02/09/17





- Discharge Diagnosis


(1) New onset a-fib


Is this a current diagnosis for this admission?: Yes





(2) Chest pain


Is this a current diagnosis for this admission?: Yes





(3) Diabetes


Is this a current diagnosis for this admission?: Yes





(4) Hypothyroidism


Is this a current diagnosis for this admission?: Yes





(5) Edema


Is this a current diagnosis for this admission?: Yes





(6) Right knee pain


Is this a current diagnosis for this admission?: Yes





(7) Schizoaffective disorder


Is this a current diagnosis for this admission?: Yes





(8) Anxiety


Is this a current diagnosis for this admission?: Yes





(9) Hypomagnesemia


Is this a current diagnosis for this admission?: Yes








- Additional Information


Resuscitation Status: Full Code


Discharge Diet: Cardiac - 1500ml daily fluid restriction


Discharge Activity: Activity As Tolerated


Home Medications: 








Alprazolam [Xanax] 1 mg PO TIDP PRN 02/08/17 


Amitriptyline HCl [Elavil 25 mg Tablet] 50 mg PO QHS 02/08/17 


Ammonium Lactate [Lac-Hydrin 12% Lotion 225Gm/Bottle] 1 applic TOP BIDP PRN 02/ 08/17 


Brexpiprazole [Rexulti] 3 mg PO QHS 02/08/17 


Clobetasol Propionate [Temovate 0.05% Cream 15 gm] 1 applic TOP BIDP PRN 02/08/ 17 


Dexlansoprazole [Dexilant 60 mg Capsule] 60 mg PO DAILY 02/08/17 


Diphenhydramine HCl [Benadryl 25 mg Capsule] 50 mg PO QHS 02/08/17 


Fluticasone/Salmeterol [Advair 250-50 Diskus 28 dose] 1 puff IH BID 02/08/17 


Levothyroxine Sodium [Synthroid 0.05 mg Tablet] 50 mcg PO QAM 02/08/17 


Metformin HCl [Glucophage] 500 mg PO BID 02/08/17 


Simvastatin [Zocor 40 mg Tablet] 40 mg PO QPM 02/08/17 


Zolpidem Tartrate [Ambien] 10 mg PO QHS 02/08/17 


Apixaban [Eliquis 5 mg Tablet] 5 mg PO BID #60 tablet 02/09/17 


Desvenlafaxine Succinate [Pristiq  mg Tab.sr] 100 mg PO DAILY 02/09/17 


Furosemide [Lasix] 20 mg PO DAILY #30 tablet 02/09/17 


Metoprolol Succinate [Toprol Xl 25 mg Tab.sr] 25 mg PO Q12 #60 tab.sr.24h 02/09/ 17 











History of Present Illness


Patient complains of: Chest pain


History of Present Illness: 


EVERARDO GEORGE is a 50 year old female with several month history of 

palpitations/heart racing and associated chest and right arm pain.  Patient has 

also noted over the same period of time swelling in her legs and weight gain.  

Patient drinks excessive amounts of fluids and seems to fixate on this when I 

talk about a fluid restriction.  In addition patient complains of several day 

history of right knee pain.  She has no injury to this area.  She has no 

history of arthritis or gout.





Medications have not been verified at the time of this dictation.  Patient 

states that she gets medications filled at [facility] pharmacy on [facility].  

She states that she takes metformin, "a fluid pill", and other medications that 

she does not recall.








Patient was noted to be in atrial fibrillation with rapid ventricular response 

in the emergency department.  She has now converted to a sinus rhythm after 

being administer Cardizem.  She is currently on Cardizem drip at 10 mg per hour.





Hospital Course


Hospital Course: 


Patient was admitted for chest pain and new onset atrial fibrillation.  She 

converted to sinus rhythm in the emergency department with Cardizem.  She was 

started on Toprol-XL for heart rate control.  She was initially started on 

Lovenox but will be transitioned to oral Eliquis on discharge.  She was 

evaluated by Dr. Eisenberg of cardiology.  She underwent stress test that showed no 

evidence of ischemia.  Echocardiogram was performed but results are pending at 

time of this dictation.  Patient was in sinus rhythm at time of discharge.  She 

was chest pain-free.  She will need to follow-up with Dr. Eisenberg as an 

outpatient.





Patient does have some sleep apnea symptoms and these will be addressed by Dr. Eisenberg as an outpatient as well.





Physical Exam


Vital Signs: 


 











Temp Pulse Resp BP Pulse Ox


 


 97.9 F   91   20   136/56 H  97 


 


 02/09/17 13:04  02/09/17 14:00  02/09/17 13:04  02/09/17 13:04  02/09/17 13:04








 Intake & Output











 02/08/17 02/09/17 02/10/17





 06:59 06:59 06:59


 


Intake Total  139 640


 


Output Total  400 400


 


Balance  -261 240


 


Weight  103.4 kg 








GENERAL: No acute distress


HEENT: Conjunctiva clear, nonicteric, moist mucous membranes, no JVD, midline 

trachea


RESPIRATORY: Clear to auscultation bilaterally, no wheezes, no rhonchi


CARDIAC: Regular rate and rhythm, no murmurs/gallops/rubs


ABDOMEN: Soft, nondistended, nontender, positive bowel sounds, no rebound, no 

guarding


EXTREMETIES: No edema, cyanosis, clubbing


NEUROLOGIC: Alert, oriented to person/place/time, CN's grossly intact,  no 

focal deficits


SKIN: No rash, wounds


PSYCH: Normal mood, unusual affect








Results


Laboratory Results: 


 





 02/09/17 07:38 





 02/09/17 10:05 





 











  02/09/17 02/09/17 02/09/17





  07:38 07:38 10:05


 


WBC   7.0 


 


RBC   3.96 


 


Hgb   12.5 


 


Hct   37.9 


 


MCV   96 


 


MCH   31.5 


 


MCHC   32.9 


 


RDW   13.1 


 


Plt Count   287 


 


Seg Neutrophils %   53.1 


 


Lymphocytes %   39.5 


 


Monocytes %   6.3 


 


Eosinophils %   0.4 


 


Basophils %   0.7 


 


Absolute Neutrophils   3.7 


 


Absolute Lymphocytes   2.8 


 


Absolute Monocytes   0.4 


 


Absolute Eosinophils   0.0 


 


Absolute Basophils   0.1 


 


Sodium  Cancelled   141.4


 


Potassium  Cancelled   4.5


 


Chloride  Cancelled   103


 


Carbon Dioxide  Cancelled   25


 


Anion Gap  Cancelled   13


 


BUN  Cancelled   6 L


 


Creatinine  Cancelled   0.85


 


Est GFR ( Amer)  Cancelled   > 60


 


Est GFR (Non-Af Amer)  Cancelled   > 60


 


Glucose  Cancelled   150 H


 


Calcium  Cancelled   10.2


 


Magnesium  Cancelled   1.7


 


Triglycerides  Cancelled   344 H


 


Cholesterol  Cancelled   200.31 H


 


LDL Cholesterol Direct  Cancelled   81


 


VLDL Cholesterol  Cancelled   68.8 H


 


HDL Cholesterol  Cancelled   56








 











  02/08/17 02/08/17 02/09/17





  19:00 19:00 01:07


 


Creatine Kinase  111   108


 


CK-MB (CK-2)   0.70 


 


Troponin I   < 0.012 














  02/09/17 02/09/17 02/09/17





  01:07 05:17 07:38


 


Creatine Kinase    Cancelled


 


CK-MB (CK-2)  0.70  


 


Troponin I  < 0.012  < 0.012 














  02/09/17 02/09/17 02/09/17





  07:38 10:05 10:05


 


Creatine Kinase   137 H 


 


CK-MB (CK-2)  Cancelled   0.93


 


Troponin I  Cancelled   < 0.012








 Labs- Last Values











WBC  7.0 10^3/uL (4.0-10.5)   02/09/17  07:38    


 


RBC  3.96 10^6/uL (3.72-5.28)   02/09/17  07:38    


 


Hgb  12.5 g/dL (12.0-15.5)   02/09/17  07:38    


 


Hct  37.9 % (36.0-47.0)   02/09/17  07:38    


 


MCV  96 fl (80-97)   02/09/17  07:38    


 


MCH  31.5 pg (27.0-33.4)   02/09/17  07:38    


 


MCHC  32.9 g/dL (32.0-36.0)   02/09/17  07:38    


 


RDW  13.1 % (11.5-14.0)   02/09/17  07:38    


 


Plt Count  287 10^3/uL (150-450)   02/09/17  07:38    


 


Total Counted  100   02/08/17  13:10    


 


Seg Neutrophils %  53.1 % (42-78)   02/09/17  07:38    


 


Seg Neuts % (Manual)  45 % (42-78)   02/08/17  13:10    


 


Lymphocytes %  39.5 % (13-45)   02/09/17  07:38    


 


Lymphocytes % (Manual)  46 % (13-45)  H  02/08/17  13:10    


 


Atypical Lymphs %  1 % (0)   02/08/17  13:10    


 


Monocytes %  6.3 % (3-13)   02/09/17  07:38    


 


Monocytes % (Manual)  8 % (3-13)   02/08/17  13:10    


 


Eosinophils %  0.4 % (0-6)   02/09/17  07:38    


 


Eosinophils % (Manual)  0 % (0-6)   02/08/17  13:10    


 


Basophils %  0.7 % (0-2)   02/09/17  07:38    


 


Basophils % (Manual)  0 % (0-2)   02/08/17  13:10    


 


Absolute Neutrophils  3.7 10^3/uL (1.7-8.2)   02/09/17  07:38    


 


Abs Neuts (Manual)  3.6 10^3/uL (1.7-8.2)   02/08/17  13:10    


 


Absolute Lymphocytes  2.8 10^3/uL (0.5-4.7)   02/09/17  07:38    


 


Abs Lymphs (Manual)  3.7 10^3/uL (0.5-4.7)   02/08/17  13:10    


 


Absolute Monocytes  0.4 10^3/uL (0.1-1.4)   02/09/17  07:38    


 


Abs Monocytes (Manual)  0.6 10^3/uL (0.1-1.4)   02/08/17  13:10    


 


Absolute Eosinophils  0.0 10^3/uL (0.0-0.6)   02/09/17  07:38    


 


Absolute Eos (Manual)  0.0 10^3/uL (0.0-0.6)   02/08/17  13:10    


 


Absolute Basophils  0.1 10^3/uL (0.0-0.2)   02/09/17  07:38    


 


Abs Basophils (Manual)  0.0 10^3/uL (0.0-0.2)   02/08/17  13:10    


 


Platelet Comment  ADEQUATE   02/08/17  13:10    


 


Polychromasia  SLIGHT   02/08/17  13:10    


 


Hypochromasia  SLIGHT   02/08/17  13:10    


 


PT  12.0 SEC (11.4-15.4)   02/08/17  13:10    


 


INR  0.86   02/08/17  13:10    


 


Sodium  141.4 mmol/L (137-145)   02/09/17  10:05    


 


Potassium  4.5 mmol/L (3.6-5.0)   02/09/17  10:05    


 


Chloride  103 mmol/L ()   02/09/17  10:05    


 


Carbon Dioxide  25 mmol/L (22-30)   02/09/17  10:05    


 


Anion Gap  13  (5-19)   02/09/17  10:05    


 


BUN  6 mg/dL (7-20)  L  02/09/17  10:05    


 


Creatinine  0.85 mg/dL (0.52-1.25)   02/09/17  10:05    


 


Est GFR ( Amer)  > 60  (>60)   02/09/17  10:05    


 


Est GFR (Non-Af Amer)  > 60  (>60)   02/09/17  10:05    


 


Glucose  150 mg/dL ()  H  02/09/17  10:05    


 


POC Glucose  119 mg/dL ()  H  02/09/17  05:40    


 


Hemoglobin A1c %  6.2 % (4.7-6.0)  H  02/09/17  07:38    


 


Calcium  10.2 mg/dL (8.4-10.2)   02/09/17  10:05    


 


Magnesium  1.7 mg/dL (1.6-2.3)   02/09/17  10:05    


 


Total Bilirubin  0.5 mg/dL (0.2-1.3)   02/08/17  13:10    


 


Direct Bilirubin  0.0 mg/dL (0.0-0.3)   02/08/17  13:10    


 


AST  31 U/L (14-36)   02/08/17  13:10    


 


ALT  28 U/L (9-52)   02/08/17  13:10    


 


Alkaline Phosphatase  105 U/L ()   02/08/17  13:10    


 


Creatine Kinase  137 U/L ()  H  02/09/17  10:05    


 


CK-MB (CK-2)  0.93 ng/mL (<4.55)   02/09/17  10:05    


 


Troponin I  < 0.012 ng/mL  02/09/17  10:05    


 


NT-Pro-B Natriuret Pep  625 pg/mL (5-900)   02/08/17  13:10    


 


Total Protein  7.1 g/dL (6.3-8.2)   02/08/17  13:10    


 


Albumin  3.9 g/dL (3.5-5.0)   02/08/17  13:10    


 


Triglycerides  344 mg/dL (<150)  H  02/09/17  10:05    


 


Cholesterol  200.31 mg/dL (0-200)  H  02/09/17  10:05    


 


LDL Cholesterol Direct  81 mg/dL (<100)   02/09/17  10:05    


 


VLDL Cholesterol  68.8 mg/dL (10-31)  H  02/09/17  10:05    


 


HDL Cholesterol  56 mg/dL (>40)   02/09/17  10:05    


 


TSH  4.29 uIU/mL (0.47-4.68)   02/08/17  13:10    


 


Free T4  0.64 ng/dL (0.78-2.19)  L  02/08/17  13:10    


 


Urine Color  STRAW   02/08/17  16:00    


 


Urine Appearance  CLEAR   02/08/17  16:00    


 


Urine pH  6.0  (5.0-9.0)   02/08/17  16:00    


 


Ur Specific Gravity  1.002   02/08/17  16:00    


 


Urine Protein  NEGATIVE mg/dL (NEGATIVE)   02/08/17  16:00    


 


Urine Glucose (UA)  NEGATIVE mg/dL (NEGATIVE)   02/08/17  16:00    


 


Urine Ketones  NEGATIVE mg/dL (NEGATIVE)   02/08/17  16:00    


 


Urine Blood  NEGATIVE  (NEGATIVE)   02/08/17  16:00    


 


Urine Nitrite  NEGATIVE  (NEGATIVE)   02/08/17  16:00    


 


Urine Bilirubin  NEGATIVE  (NEGATIVE)   02/08/17  16:00    


 


Urine Urobilinogen  NEGATIVE mg/dL (<2.0)   02/08/17  16:00    


 


Ur Leukocyte Esterase  NEGATIVE  (NEGATIVE)   02/08/17  16:00    


 


Urine WBC (Auto)  1 /HPF  02/08/17  16:00    


 


Urine Bacteria (Auto)  TRACE /HPF  02/08/17  16:00    


 


Squamous Epi Cells Auto  3 /HPF  02/08/17  16:00    


 


Urine Ascorbic Acid  NEGATIVE  (NEGATIVE)   02/08/17  16:00    


 


Urine Opiates Screen  NEGATIVE   02/08/17  16:00    


 


Urine Methadone Screen  NEGATIVE   02/08/17  16:00    


 


Ur Barbiturates Screen  NEGATIVE   02/08/17  16:00    


 


Ur Phencyclidine Scrn  NEGATIVE   02/08/17  16:00    


 


Ur Amphetamines Screen  NEGATIVE   02/08/17  16:00    


 


U Benzodiazepines Scrn  UNCONFIRMED POSITIVE   02/08/17  16:00    


 


Urine Cocaine Screen  NEGATIVE   02/08/17  16:00    


 


U Marijuana (THC) Screen  NEGATIVE   02/08/17  16:00    











Impressions: 


 





Chest X-Ray  02/08/17 12:56


IMPRESSION:  NO ACUTE RADIOGRAPHIC FINDING IN THE CHEST.


 








Knee X-Ray  02/08/17 12:58


IMPRESSION:  NEGATIVE STUDY OF THE RIGHT KNEE. NO EXPLANATION FOR PAIN.


 








Chest/Abdomen CTA  02/08/17 14:17


IMPRESSION:  No acute consolidations or pleural effusions are identified. NO 

PULMONARY EMBOLI.  Other findings as noted above


 














Qualifiers


**PATEINT BEING DISCHARGED WITH ANY OF THE FOLLOWING DIAGNOSIS?: No





Plan


Discharge Plan: 


Follow-up with primary care.  Follow-up Dr. Eisenberg of cardiology.


Time Spent: Less than 30 Minutes

## 2017-02-09 NOTE — EKG REPORT
SEVERITY:- ABNORMAL ECG -

SINUS RHYTHM

NONSPECIFIC T ABNORMALITIES, DIFFUSE LEADS

BORDERLINE PROLONGED QT INTERVAL

:

Confirmed by: Tawanda Ramirez MD 09-Feb-2017 08:28:27

## 2017-02-09 NOTE — DRAGON STRESS TEST REPORT
INTRAVENOUS LEXISCAN CARDIOLITE STRESS TEST USING SINGLE PHOTON EMMISION 
COMPUTERIZED TOMOGRAPHIC.



DATE OF PROCEDURE: February 9, 2017



INDICATION : Chest pain



CARDIAC RISK FACTORS: Diabetes, hypertension, dyslipidemia, family history of 
CAD and CVA



RESTING EKG: Chest pain



STRESS EKG: No significant changes noted with LexiScan bolus 



REASON FOR TERMINATION: Protocol.





PROCEDURE REPORT: Baseline heart rate 90 beats per minute with blood pressure 
of 133/68.  Patient had no significant complaints.  Heart rate at 2 minutes 
post bolus 115 with a blood pressure of 108/77.  3 minutes post bolus heart 
rate 98 with blood pressure of 110/60.  No significant EKG changes were noted.  
Patient had no significant complaints during the procedure or postprocedure.



CONCLUSIONS: Normal EKG and hemodynamic response to IV LexiScan.







NUCLEAR DATA:

At rest the patient was given 14.35 millicuries of technetium 99 sestamibi 
injected intravenously.  As per protocol rest gated SPECT images were obtained.
  Subsequently the patient was given intravenous LexiScan at a dose of 0.4 mg 
in 5 mL intravenously, followed by flush with normal saline.  Subsequently the 
stress dose of 43.4 millicuries of technetium 99 sestamibi was injected 
intravenously.  As per protocol stress gated images were obtained.  



NUCLEAR INTERPRETATION: Both raw and processed data were used for 
interpretation.  Visual, qualitative, computer-generated quantitative data was 
used.  There was good myocardial uptake of technetium compound.  Motion 
artifact and soft tissue attenuations were noted.  Increased visceral uptake 
was noted.  No definitive areas of transient perfusion defect noted.  No 
definitive areas of fixed perfusion defect or scars noted.



EKG gated imaging showed LV EF at 53 %, rest and stress gated EF similar 
visually.  T. I D. ratio was 0.91.  Lung heart ratio noted to be within normal 
limits 0.28.  No significant extracardiac and abnormal radiotracer activities 
were noted.  RV free wall uptake was noted to be borderline increased.



IMPRESSION: Also refer to comments under nuclear interpretation. Also test 
results needs to be interpreted in the context of pretest probability.





1.  There is no definitive scintigraphic evidence of LexiScan induced 
myocardial ischemia.

2.  There is no definitive scintigraphic evidence of myocardial infarction/scar.

3.  EKG gated imaging shows left ejection fraction of approximately 53 %. 

4.  Clinical correlation requested as occasionally single vessel disease or 
balanced ischemia could be missed. In approximately 10% of the cases Lexiscan 
may not cause adequate vasodilatory stress.



RECOMMENDATIONS:

Aggressive risk factor modification, medical therapy.

Clinical correlation with echocardiogram derived ejection fraction.

Inability to exercise by itself can lead to increased cardiovascular event 
risks.

Consider cardiology consultation if clinically indicated.

I AM AVAILABLE FOR CARDIOLOGY CONSULTATION AND FOLLOWUP IF REQUESTED BY PMD











Reuben Eisenberg M.D., MRCP

Consultant cardiologist,

Board certified in cardiovascular diseases, 

Nuclear cardiology, 

Echocardiography

Cardiac CT and cardiac MRI

Ph. [phone] 

Fax [phone]
[facility]

## 2017-02-09 NOTE — EKG REPORT
SEVERITY:- ABNORMAL ECG -

SINUS OR ECTOPIC ATRIAL RHYTHM

NONSPECIFIC T ABNORMALITIES, DIFFUSE LEADS

:

Confirmed by: Tawanda Ramirez MD 09-Feb-2017 08:28:55

## 2017-02-09 NOTE — EKG REPORT
SEVERITY:- ABNORMAL ECG -

SINUS RHYTHM

NONSPECIFIC T ABNORMALITIES, DIFFUSE LEADS

BORDERLINE PROLONGED QT INTERVAL

:

Confirmed by: Tawanda Ramirez MD 09-Feb-2017 08:28:35

## 2017-04-06 NOTE — ER DOCUMENT REPORT
ED Eye Complaint





- General


Chief Complaint: Eye Problem


Stated Complaint: LEFT EYE PROBLEM


Mode of Arrival: Ambulatory


Information source: Patient


Notes: 


51 y/o F presents to ED c/o left eye redness, crusting with yellow-whitish 

drainage over the last 2 days. Pt reports had symptoms in right eye earlier 

this week and now has it in left eye. Denies fever, eye injury or foreign body, 

vision changes, or eye pain. 


TRAVEL OUTSIDE OF THE [place] IN LAST 30 DAYS: No





- HPI


Eye location: Left


Injury: No


Quality of pain: No pain


Severity: Mild


Contact lenses worn: No


Associated symptoms: Redness, Matting.  denies: Pain, Photophobia, Eyelid 

swelling, Orbital swelling, Foreign body sensation, Blurred vision, Double 

vision, Decreased vision, Loss of vision





- Related Data


Allergies/Adverse Reactions: 


 





tramadol Allergy (Intermediate, Verified 04/06/17 20:26)


 AMS


Penicillins Allergy (Mild, Verified 04/06/17 20:26)


 rash


Sulfa (Sulfonamide Antibiotics) Allergy (Mild, Verified 04/06/17 20:26)


 rash


aspirin Allergy (Verified 04/06/17 20:26)


 











Past Medical History





- General


Information source: Patient





- Social History


Smoking Status: Never Smoker


Frequency of alcohol use: None


Drug Abuse: None


Lives with: Family


Family History: CAD, CVA, DM, Hyperlipidemia, Hypertension, Malignancy, Thyroid 

Disfunction, Other - asthma


Patient has suicidal ideation: No


Patient has homicidal ideation: No





- Past Medical History


Cardiac Medical History: Reports: Hx Hypercholesterolemia


   Denies: Hx Coronary Artery Disease, Hx Heart Attack, Hx Hypertension


Pulmonary Medical History: Reports: Hx Asthma, Hx Bronchitis


   Denies: Hx COPD, Hx Pneumonia


Neurological Medical History: Denies: Hx Cerebrovascular Accident, Hx Seizures


Endocrine Medical History: Reports: Hx Diabetes Mellitus Type 2 - borderline, 

Hx Hypothyroidism


Renal/ Medical History: Denies: Hx Peritoneal Dialysis


GI Medical History: Reports: Hx Gastritis, Hx Gastroesophageal Reflux Disease


Musculoskeltal Medical History: Reports Hx Arthritis, Reports Hx 

Musculoskeletal Trauma


Psychiatric Medical History: Reports: Hx Anxiety, Hx Depression - anxiety, Hx 

Schizophrenia


Past Surgical History: Reports: Hx Cholecystectomy, Hx Tubal Ligation.  Denies: 

Hx Hysterectomy





- Immunizations


Immunizations up to date: Yes


Hx Diphtheria, Pertussis, Tetanus Vaccination: Yes





Review of Systems





- Review of Systems


Constitutional: No symptoms reported


EENT: See HPI


Cardiovascular: No symptoms reported


Respiratory: No symptoms reported


Gastrointestinal: No symptoms reported


Genitourinary: No symptoms reported


Female Genitourinary: No symptoms reported


Musculoskeletal: No symptoms reported


Skin: No symptoms reported


Hematologic/Lymphatic: No symptoms reported


Neurological/Psychological: No symptoms reported


-: Yes All other systems reviewed and negative





Physical Exam





- Vital signs


Vitals: 


 











Temp Pulse Resp BP Pulse Ox


 


 97.9 F   90   20   127/74 H  96 


 


 04/06/17 20:21  04/06/17 20:21  04/06/17 20:21  04/06/17 20:21  04/06/17 20:21











Interpretation: Normal





- General


General appearance: Appears well, Alert


In distress: None





- HEENT


Head: Normocephalic, Atraumatic.  No: Correia's sign, Ecchymosis, Open wounds


Eyes: Normal.  No: Periorbital ecchymosis, Periorbital edema


Conjunctiva: Injected - left.  No: Purulent discharge


Cornea: Normal.  No: Corneal abrasion, Corneal ulcer, Dendrite, Embedded 

foreign body, Flourescein stain uptake, Opacified, Superficial foreign body


Extraocular movements intact: Yes


Eyelashes: Normal


Pupils: PERRL


Lids everted for exam: bilateral: Normal


Anterior chamber: Normal


Fundascopic: Normal


Nerve palsy: No


Visual fields normal: Yes


Ears: Normal


External canal: Normal


Tympanic membrane: Normal


Sinus: Normal


Nasal: Normal


Mouth/Lips: Normal


Mucous membranes: Normal, Moist


Pharynx: Normal.  No: Blood in hypopharynx, Erythema, Exudate, Peritonsillar 

abscess, Post nasal drainage, Retropharyngeal abscess, Tonsillar hypertrophy, 

Uvular edema, Potential airway comprom., Other


Neck: Normal.  No: Anterior cervical chain, Posterior cervical chain, 

Lymphadenopathy, Meningismus, Subcutaneous emphysema





- Respiratory


Respiratory status: No respiratory distress


Chest status: Nontender


Breath sounds: Normal


Chest palpation: Normal





- Cardiovascular


Rhythm: Regular


Heart sounds: Normal auscultation


Murmur: No





- Neurological


Neuro grossly intact: Yes


Cognition: Normal


Orientation: AAOx4


Valentina Coma Scale Eye Opening: Spontaneous


Valentina Coma Scale Verbal: Oriented


Harrison Coma Scale Motor: Obeys Commands


Valentina Coma Scale Total: 15


Speech: Normal


Motor strength normal: LUE, RUE, LLE, RLE


Sensory: Normal





- Skin


Skin Temperature: Warm


Skin Moisture: Dry


Skin Color: Normal





Course





- Re-evaluation


Re-evalutation: 





04/06/17 22:05


Patient hemodynamically stable, in no distress, afebrile.  Physical exam 

unremarkable with findings suggestive of mild conjunctivitis at this time.  

Patient appears stable for discharge and agrees with home care, follow-up, and 

ED return precautions.





- Vital Signs


Vital signs: 


 











Temp Pulse Resp BP Pulse Ox


 


 97.6 F   83   18   112/62   96 


 


 04/06/17 23:25  04/06/17 23:25  04/06/17 23:25  04/06/17 23:25  04/06/17 23:25

















Discharge





- Discharge


Clinical Impression: 


Conjunctivitis


Qualifiers:


 Conjunctivitis type: unspecified Laterality: left Qualified Code(s): H10.9 - 

Unspecified conjunctivitis





Condition: Stable


Disposition: HOME, SELF-CARE


Additional Instructions: 


CONJUNCTIVITIS:





     You have an infection in your eye, commonly known as "pink eye." 

Conjunctivitis causes redness, mild discomfort, itching, and mattering on the 

eyelids.  It is very contagious, so you must be careful to wash your hands 

after touching your face so you don't pass the infection on to others.


     Conjunctivitis is caused by both viruses and bacteria.  It usually 

responds quickly to treatment with antibiotic drops.  These should be placed in 

the eye as prescribed (usually every three to four hours while you're awake).  

If you wear contact lenses, don't put them in your eyes until the infection is 

cleared and you are no longer using the drops (unless your doctor advises you 

otherwise).


     Should you develop increasing eye pain, severe swelling, decreased vision, 

or fail to improve as expected, please return for re-examination.








EYEDROP USE:


     Eyedrops are most easily applied by pulling down on the cheek just below 

the lower eyelid.  The lower lid will pop out to form a pouch into which you 

can drop the medicine.


     A small brief sting is not unusual, especially if the eye is reddened and 

irritated already.


     Use the drops exactly as recommended.  You should see the doctor at once 

if there is a decrease in vision, swelling of the eye, or an increase in 

discomfort.








ANTIBIOTIC THERAPY:


     You have been given an antibiotic prescription.  It's important that you 

take all the medication, unless instructed otherwise by your physician.  

Failure to complete the entire course can result in relapse of your condition.


     Common side effects of antibiotics include nausea, intestinal cramping, or 

diarrhea.  Women may develop vaginal yeast infections, and babies can get yeast 

(thrush) in the mouth following the use of antibiotics.  Contact your physician 

if you develop significant side effects from this medication.


     Allergy to this antibiotic can result in hives, wheezing, faintness, or 

itching.  If symptoms of allergy occur, stop the medication and call the doctor.








FOLLOW-UP CARE:


Follow-up with your primary care provider and Ophthalmology this week. 


Return to the Emergency Department for any worsening symptoms or concerns. 


Prescriptions: 


Polymyxin B Sulf/Trimethoprim [Polytrim Eye Drops] 1 drop OS QID 7 Days


Referrals: 


SCOOTER REZA MD [Primary Care Provider] - Follow up tomorrow


MADDIE ELLISON MD [ACTIVE STAFF] - Follow up tomorrow

## 2017-06-10 NOTE — ER DOCUMENT REPORT
ED GI/





- General


Mode of Arrival: Wheelchair


Information source: Patient


TRAVEL OUTSIDE OF THE [place] IN LAST 30 DAYS: No





- HPI


Patient complains to provider of: Diarrhea


Associated symptoms: Other - See above





<ANITRA YU - Last Filed: 06/10/17 20:27>





<CATARINA GARCIA - Last Filed: 06/10/17 22:08>





- General


Chief Complaint: Diarrhea


Stated Complaint: DIARHHEA,LIGHTHEADED


Time Seen by Provider: 06/10/17 16:30


Notes: 


Patient is a 51 year old female, with a past medical history including diabetes 

and cholecystectomy, who presents to the emergency department complaining of 

diarrhea onset yesterday. Patient states that the diarrhea is green colored and 

has not been worsening or getting better. Patient also complains of upper 

abdominal pain. Patient denies vomiting, bad food, recent travel, recent 

antibiotic use, or eating fatty foods before the diarrhea began. Patient states 

she did eat pizza after it started. Patient had an endoscopy performed somewhat 

recently and was told she had diverticulitis.  (ANITRA YU)





- Related Data


Allergies/Adverse Reactions: 


 





tramadol Allergy (Intermediate, Verified 06/10/17 16:17)


 AMS


Penicillins Allergy (Mild, Verified 06/10/17 16:17)


 rash


Sulfa (Sulfonamide Antibiotics) Allergy (Mild, Verified 06/10/17 16:17)


 rash


aspirin Allergy (Verified 06/10/17 16:17)


 











Past Medical History





- General


Information source: Patient





- Social History


Smoking Status: Never Smoker


Chew tobacco use (# tins/day): No


Frequency of alcohol use: None


Drug Abuse: None


Family History: Reviewed & Not Pertinent, CAD, CVA, DM, Hyperlipidemia, 

Hypertension, Malignancy, Thyroid Disfunction, Other - asthma


Patient has suicidal ideation: No


Patient has homicidal ideation: No





- Past Medical History


Cardiac Medical History: Reports: Hx Atrial Fibrillation, Hx 

Hypercholesterolemia


Pulmonary Medical History: Reports: Hx Asthma, Hx Bronchitis


Endocrine Medical History: Reports: Hx Diabetes Mellitus Type 2 - borderline, 

Hx Hypothyroidism


GI Medical History: Reports: Hx Gastritis, Hx Gastroesophageal Reflux Disease


Musculoskeltal Medical History: Reports Hx Arthritis, Reports Hx 

Musculoskeletal Trauma


Psychiatric Medical History: Reports: Hx Anxiety, Hx Depression - anxiety, Hx 

Schizophrenia


Past Surgical History: Reports: Hx Cholecystectomy, Hx Tubal Ligation





- Immunizations


Immunizations up to date: Yes


Hx Diphtheria, Pertussis, Tetanus Vaccination: Yes





<GIGIANITRA - Last Filed: 06/10/17 20:27>





Review of Systems





- Review of Systems


Constitutional: No symptoms reported


EENT: No symptoms reported


Cardiovascular: No symptoms reported


Respiratory: No symptoms reported


Gastrointestinal: See HPI, Abdominal pain, Diarrhea.  denies: Vomiting


Genitourinary: No symptoms reported


Female Genitourinary: No symptoms reported


Musculoskeletal: No symptoms reported


Skin: No symptoms reported


Hematologic/Lymphatic: No symptoms reported


Neurological/Psychological: No symptoms reported


-: Yes All other systems reviewed and negative





<YUANITRA - Last Filed: 06/10/17 20:27>





Physical Exam





- Vital signs


Interpretation: Normal





- General


General appearance: Alert


In distress: None - appears uncomfortable





- HEENT


Head: Normocephalic, Atraumatic


Eyes: Normal


Pupils: PERRL





- Respiratory


Respiratory status: No respiratory distress


Chest status: Nontender


Breath sounds: Normal


Chest palpation: Normal





- Cardiovascular


Rhythm: Regular


Heart sounds: Normal auscultation


Murmur: No





- Abdominal


Inspection: Normal


Distension: Distended - Mild


Bowel sounds: Normal


Tenderness: Tender - Bilateral upper quadrant tenderness to palpation, 

Guarding.  No: Rebound


Organomegaly: No organomegaly





- Back


Back: Normal, Nontender





- Extremities


General upper extremity: Normal inspection, Nontender, Normal color, Normal ROM

, Normal temperature


General lower extremity: Normal inspection, Nontender, Normal color, Normal ROM

, Normal temperature, Normal weight bearing.  No: Eliane's sign





- Neurological


Neuro grossly intact: Yes


Cognition: Normal


Orientation: AAOx4


Valentina Coma Scale Eye Opening: Spontaneous


Valentina Coma Scale Verbal: Oriented


Valentina Coma Scale Motor: Obeys Commands


Eads Coma Scale Total: 15


Speech: Normal


Motor strength normal: LUE, RUE, LLE, RLE


Sensory: Normal





- Psychological


Associated symptoms: Normal affect, Normal mood





- Skin


Skin Temperature: Warm


Skin Moisture: Dry


Skin Color: Normal





<CATARINA GARCIA - Last Filed: 06/10/17 22:08>





- Vital signs


Vitals: 


 











Temp Pulse Resp BP Pulse Ox


 


 97.9 F   81   22 H  132/99 H  96 


 


 06/10/17 16:17  06/10/17 16:17  06/10/17 16:17  06/10/17 16:17  06/10/17 16:17














Course





- Laboratory


Result Diagrams: 


 06/10/17 16:40





 06/10/17 16:40





<ANITRA YU - Last Filed: 06/10/17 20:27>





- Laboratory


Result Diagrams: 


 06/10/17 16:40





 06/10/17 16:40





- Diagnostic Test


Radiology reviewed: Reports reviewed





<CATARINA GARCIA - Last Filed: 06/10/17 22:08>





- Re-evaluation


Re-evalutation: 


06/10/17 18:35


No acute findings on blood work or CT.  Patient is taking p.o. and is feeling 

better.  She will be discharged home is to follow-up with her doctor.  Stable 

for discharge.  No further diarrhea in the emergency department. (CATARINA GARCIA)





- Vital Signs


Vital signs: 


 











Temp Pulse Resp BP Pulse Ox


 


 97.9 F   85   20   120/75   95 


 


 06/10/17 16:17  06/10/17 21:38  06/10/17 21:38  06/10/17 21:38  06/10/17 21:38














- Laboratory


Laboratory results interpreted by me: 


 











  06/10/17 06/10/17





  16:40 16:50


 


Chloride  97 L 


 


Glucose  203 H 


 


AST  39 H 


 


Alkaline Phosphatase  130 H 


 


Urine Blood   SMALL H














Discharge





<ANITRA YU - Last Filed: 06/10/17 20:27>





<CATARINA GARCIA - Last Filed: 06/10/17 22:08>





- Discharge


Clinical Impression: 


Diarrhea


Qualifiers:


 Diarrhea type: unspecified type Qualified Code(s): R19.7 - Diarrhea, 

unspecified





Abdominal pain


Qualifiers:


 Abdominal location: unspecified location Qualified Code(s): R10.9 - 

Unspecified abdominal pain





Condition: Stable


Disposition: HOME, SELF-CARE


Instructions:  Abdominal Pain ([facility]), Diarrhea, Nonspecific ([facility])


Scribe Attestation: 





06/10/17 22:08


I personally performed the services described in the documentation, reviewed 

and edited the documentation which was dictated to the scribe in my presence, 

and it accurately records my words and actions. (CATARINA GARCIA)





Scribe Documentation





- Scribe


Written by Scribe:: marilee Varghese, 6/10/17, 2027


acting as scribe for :: Luis





<ANITRA YU - Last Filed: 06/10/17 20:27>

## 2017-06-10 NOTE — RADIOLOGY REPORT (SQ)
EXAM DESCRIPTION:  CT ABD/PELVIS WITH IV   ORAL



COMPLETED DATE/TIME:  6/10/2017 8:08 pm



REASON FOR STUDY:  abd pain, nausea, diarrhea



COMPARISON:  None.



TECHNIQUE:  CT scan of the abdomen and pelvis performed using helical scanning technique with dynamic
 intravenous contrast injection.  No oral contrast. Images reviewed with lung, soft tissue, and bone 
windows. Reconstructed coronal and sagittal MPR images reviewed. Delayed images for evaluation of the
 urinary system also acquired. All images stored on PACS.

All CT scanners at this facility use dose modulation, iterative reconstruction, and/or weight based d
osing when appropriate to reduce radiation dose to as low as reasonably achievable (ALARA).

[facility]: Dose Right  [facility]: CareDose    [facility]: Dose Right    [facility]: Teradose 4D    [facility]: Smart Technologies



CONTRAST TYPE AND DOSE:  100 mL Isovue 370- low osmolar.



RENAL FUNCTION:  BUN 8; creatinine 0.76



RADIATION DOSE:  41.00mGy.



LIMITATIONS:  None.



FINDINGS:  LOWER CHEST: No significant findings. No nodules or infiltrates.

LIVER: Normal size and attenuation of the liver.  Trace prominence of the central intrahepatic biliar
y system.

SPLEEN: Normal size. No focal lesions.

PANCREAS: No masses. No significant calcifications. No adjacent inflammation or peripancreatic fluid 
collections. Pancreatic duct not dilated.

GALLBLADDER: Status postcholecystectomy.  The common bile duct is prominent, measuring on the order o
f 8 to 9 mm; this is not an unexpected finding in the setting of cholecystectomy.

ADRENAL GLANDS: No significant masses or asymmetry.

RIGHT KIDNEY AND URETER: The right kidney is located within the pelvis ; the right renal artery arise
s from the aortic bifurcation.  No solid masses.   No significant calcifications.   No hydronephrosis
 or hydroureter.

LEFT KIDNEY AND URETER: No solid masses.   No significant calcifications.   No hydronephrosis or hydr
oureter.

AORTA AND VESSELS: No aneurysm. No dissection. Renal arteries, SMA, celiac without stenosis.

RETROPERITONEUM: No retroperitoneal adenopathy, hemorrhage or masses.

BOWEL AND PERITONEAL CAVITY: The colon is largely decompressed; the appearance of circumferential mur
al thickening is favored to be on the basis of nondistention.  Enteric contrast is seen to the level 
of the terminal ileum.  No masses or inflammatory changes. No free fluid or peritoneal masses.

APPENDIX: Not visualized.

PELVIS: No mass or free fluid. Normal bladder.

ABDOMINAL WALL: No masses. No hernias.

BONES: No significant or acute findings.

OTHER: No other significant finding.



IMPRESSION:  1. No overt infectious/ inflammatory abnormalities identified.

2.  Incidental finding of renal ectopia.  The right kidney is located within the bony pelvis, with th
e right renal artery arising from the aortic bifurcation.

3.  Status post cholecystectomy, noting mild central intrahepatic biliary dilatation and prominence o
f the common bile duct.



TECHNICAL DOCUMENTATION:  JOB ID:  [id number]

Quality ID # [id number]: Final reports with documentation of one or more dose reduction techniques (e.g., Au
tomated exposure control, adjustment of the mA and/or kV according to patient size, use of iterative 
reconstruction technique)

 2011 [employer]- All Rights Reserved

## 2017-06-10 NOTE — ER DOCUMENT REPORT
ED Medical Screen (RME)





- General


Chief Complaint: Diarrhea


Stated Complaint: DIARHHEA,LIGHTHEADED


Time Seen by Provider: 06/10/17 16:30


Mode of Arrival: Wheelchair


Information source: Patient


Notes: 


This is a 51-year-old female with multiple medical problems (obesity, 

paroxysmal afib on Eliquis, hyperlipidemia, DM, GERD, schizoaffective DO)  who 

presents with diarrhea for 2 days and also difficulty urinating.  She states 

that she is not urinating very much in the past 24 hours.  No vomiting but she 

has had nausea.  No fevers or chills.  She does report some upper abdominal 

discomfort but no chest pain.  Of note she states that her primary care 

physician increase her Lasix to 80 mg once a day just a few days ago.





Denies recent antibiotics.





I have greeted and performed a rapid initial assessment of this patient.  A 

comprehensive ED assessment and evaluation of the patient, analysis of test 

results and completion of the medical decision making process will be conducted 

by additional ED providers.


TRAVEL OUTSIDE OF THE [place] IN LAST 30 DAYS: No





- Related Data


Allergies/Adverse Reactions: 


 





tramadol Allergy (Intermediate, Verified 06/10/17 16:17)


 AMS


Penicillins Allergy (Mild, Verified 06/10/17 16:17)


 rash


Sulfa (Sulfonamide Antibiotics) Allergy (Mild, Verified 06/10/17 16:17)


 rash


aspirin Allergy (Verified 06/10/17 16:17)


 











Past Medical History





- Social History


Chew tobacco use (# tins/day): No


Frequency of alcohol use: None


Drug Abuse: None





- Past Medical History


Cardiac Medical History: Reports: Hx Atrial Fibrillation, Hx 

Hypercholesterolemia


   Denies: Hx Coronary Artery Disease, Hx Heart Attack, Hx Hypertension


Pulmonary Medical History: Reports: Hx Asthma, Hx Bronchitis


   Denies: Hx COPD, Hx Pneumonia


Neurological Medical History: Denies: Hx Cerebrovascular Accident, Hx Seizures


Endocrine Medical History: Reports: Hx Diabetes Mellitus Type 2 - borderline, 

Hx Hypothyroidism


Renal/ Medical History: Denies: Hx Peritoneal Dialysis


GI Medical History: Reports: Hx Gastritis, Hx Gastroesophageal Reflux Disease


Musculoskeltal Medical History: Reports Hx Arthritis, Reports Hx 

Musculoskeletal Trauma


Psychiatric Medical History: Reports: Hx Anxiety, Hx Depression - anxiety, Hx 

Schizophrenia


Past Surgical History: Reports: Hx Cholecystectomy, Hx Tubal Ligation.  Denies: 

Hx Hysterectomy





- Immunizations


Immunizations up to date: Yes


Hx Diphtheria, Pertussis, Tetanus Vaccination: Yes





Physical Exam





- Vital signs


Vitals: 


 











Temp Pulse Resp BP Pulse Ox


 


 97.9 F   81   22 H  132/99 H  96 


 


 06/10/17 16:17  06/10/17 16:17  06/10/17 16:17  06/10/17 16:17  06/10/17 16:17














Course





- Vital Signs


Vital signs: 


 











Temp Pulse Resp BP Pulse Ox


 


 97.9 F   81   22 H  132/99 H  96 


 


 06/10/17 16:17  06/10/17 16:17  06/10/17 16:17  06/10/17 16:17  06/10/17 16:17

## 2017-10-21 NOTE — ER DOCUMENT REPORT
ED General





- General


Chief Complaint: Abdominal Pain


Stated Complaint: ABDOMINAL PAIN


Time Seen by Provider: 10/21/17 15:19


Mode of Arrival: Ambulatory


Information source: Patient


Notes: 





Patient presents emergency department with complaints of abdominal pain left 

upper and lower quadrant for the past few days.  Patient reports that  her last 

good bowel movement was 1 week ago.  She reports green diarrhea days ago and 

the last few days she has had chunky  brown stools.  Denies fever vomiting.  

Denies pain with void but reports urinary frequency.


TRAVEL OUTSIDE OF THE [place] IN LAST 30 DAYS: No





- HPI


Onset: Other


Onset/Duration: Persistent


Quality of pain: Achy


Pain Level: 5


Associated symptoms: Nausea


Exacerbated by: Movement


Relieved by: Denies


Similar symptoms previously: No


Recently seen / treated by doctor: No





- Related Data


Allergies/Adverse Reactions: 


 





tramadol Allergy (Intermediate, Verified 10/21/17 14:51)


 AMS


Penicillins Allergy (Mild, Verified 10/21/17 14:51)


 rash


Sulfa (Sulfonamide Antibiotics) Allergy (Mild, Verified 10/21/17 14:51)


 rash


aspirin Allergy (Verified 10/21/17 14:51)


 











Past Medical History





- General


Information source: Patient


Last Menstrual Period: march





- Social History


Smoking Status: Former Smoker


Cigarette use (# per day): No


Chew tobacco use (# tins/day): No


Frequency of alcohol use: None


Drug Abuse: None


Occupation: none


Lives with: Family


Family History: CAD, CVA, DM, Hyperlipidemia, Hypertension, Malignancy, Thyroid 

Disfunction, Other





- Past Medical History


Cardiac Medical History: Reports: Hx Atrial Fibrillation, Hx 

Hypercholesterolemia


   Denies: Hx Coronary Artery Disease, Hx Heart Attack, Hx Hypertension


Pulmonary Medical History: Reports: Hx Asthma, Hx Bronchitis


   Denies: Hx COPD, Hx Pneumonia


Neurological Medical History: Denies: Hx Cerebrovascular Accident, Hx Seizures


Endocrine Medical History: Reports: Hx Diabetes Mellitus Type 2 - borderline, 

Hx Hypothyroidism


Renal/ Medical History: Denies: Hx Peritoneal Dialysis


GI Medical History: Reports: Hx Gastritis, Hx Gastroesophageal Reflux Disease


Musculoskeltal Medical History: Reports Hx Arthritis, Reports Hx 

Musculoskeletal Trauma


Psychiatric Medical History: Reports: Hx Anxiety, Hx Depression - anxiety, Hx 

Schizophrenia


Past Surgical History: Reports: Hx Cholecystectomy, Hx Tubal Ligation.  Denies: 

Hx Hysterectomy





- Immunizations


Immunizations up to date: Yes


Hx Diphtheria, Pertussis, Tetanus Vaccination: Yes - 2013





Review of Systems





- Review of Systems


Notes: 





Review HPI for review of systems., All other systems negative





Physical Exam





- Vital signs


Vitals: 


 











Temp Pulse Resp BP Pulse Ox


 


 97.7 F   97   16   129/82 H  96 


 


 10/21/17 14:49  10/21/17 14:49  10/21/17 14:49  10/21/17 14:49  10/21/17 14:49














- Notes


Notes: 





PHYSICAL EXAMINATION: 


GENERAL: Non toxic looking,  in no acute distress 


HEAD: Atraumatic, normocephalic. 


EYES: Pupils equal round  extraocular movements intact, sclera anicteric, 

conjunctiva are normal. 


ENT: nares patent,   Moist mucous membranes. 


NECK: Normal range of motion, supple without lymphadenopathy 


LUNGS: CTAB and equal. No wheezes rales or rhonchi. 


HEART: Regular rate and rhythm without murmurs 


ABDOMEN: Soft, left upper/lower ttp,  +[id number],No guarding, no rebound 


BACK:  Denies pain


EXTREMITIES: Normal range of motion, no pitting edema. No cyanosis. 


NEUROLOGICAL: Cranial nerves grossly intact. Normal sensory/motor exams. 


PSYCH: Normal mood, normal affect. 


SKIN: Warm, Dry, normal turgor, no rashes or lesions noted








Course





- Re-evaluation


Re-evalutation: 





10/21/17 17:44


Labs unremarkable KUB negative





- Vital Signs


Vital signs: 


 











Temp Pulse Resp BP Pulse Ox


 


 97.7 F   97   16   129/82 H  96 


 


 10/21/17 14:49  10/21/17 14:49  10/21/17 14:49  10/21/17 14:49  10/21/17 14:49














- Laboratory


Result Diagrams: 


 10/21/17 15:43





 10/21/17 15:43


Laboratory results interpreted by me: 


 











  10/21/17 10/21/17





  15:43 15:43


 


Chloride  97 L 


 


Est GFR ( Amer)  55 L 


 


Est GFR (Non-Af Amer)  45 L 


 


AST  41 H 


 


Alkaline Phosphatase  151 H 


 


Ur Leukocyte Esterase   MODERATE H














- Diagnostic Test


Radiology reviewed: Image reviewed, Reports reviewed - neg KUB





Discharge





- Discharge


Clinical Impression: 


Abdominal pain


Qualifiers:


 Abdominal location: unspecified location Qualified Code(s): R10.9 - 

Unspecified abdominal pain





Condition: Stable


Disposition: HOME, SELF-CARE


Instructions:  Abdominal Pain ([facility])


Additional Instructions: 


*You have been evaluated for abdominal pain, nausea


*Take medication as prescribed


*Follow up with a primary care provider within 5 days


*Return to ED for worsening condition, changes, needs


*Return to ED if not better in 24 hours








Monitor your blood pressure. Your blood pressure was elevated today.  This may 

be because you were anxious, in pain or because you need medication.  It is 

important to follow up with your primary care provider for full evaluation.


Forms:  Elevated Blood Pressure

## 2017-10-21 NOTE — RADIOLOGY REPORT (SQ)
EXAM DESCRIPTION:  KUB/ABDOMEN (SINGLE VIEW)



COMPLETED DATE/TIME:  10/21/2017 5:21 pm



REASON FOR STUDY:  abd pain



COMPARISON:  None.



NUMBER OF VIEWS:  One view.



TECHNIQUE:   Supine radiographic image of the abdomen acquired.



LIMITATIONS:  None.



FINDINGS:  BOWEL GAS PATTERN: Normal bowel gas pattern. No dilated loops.

CALCIFICATIONS: No suspicious calcifications.

SOFT TISSUES: No gross mass or suggestion of organomegaly.

HARDWARE: Incidental note is made of cholecystectomy clips.

BONES: No acute fracture. No worrisome bone lesions.

OTHER: No other significant finding.



IMPRESSION:  NO RADIOGRAPHIC EVIDENCE FOR ACUTE ABDOMINAL DISEASE.



TECHNICAL DOCUMENTATION:  JOB ID:  [id number]

 2011 [employer]- All Rights Reserved

## 2017-11-11 NOTE — ER DOCUMENT REPORT
ED General





- General


Chief Complaint: Vomiting


Stated Complaint: VOMITING


Time Seen by Provider: 11/11/17 18:00


Mode of Arrival: Wheelchair


Information source: Patient


Notes: 





51-year-old female presents with 3 day duration nausea vomiting diarrhea.  

Patient denies any fevers or chills notes she has had cramping in her abdomen


Patient notes more vomiting rather than diarrhea.  Patient has had partial 

hysterectomy and cholecystectomy


TRAVEL OUTSIDE OF THE [place] IN LAST 30 DAYS: No





- HPI


Onset: Other


Onset/Duration: Persistent


Quality of pain: Cramping


Severity: Mild


Pain Level: 1


Associated symptoms: Diarrhea, Nausea, Vomiting


Exacerbated by: Denies


Relieved by: Denies


Similar symptoms previously: No


Recently seen / treated by doctor: No





- Related Data


Allergies/Adverse Reactions: 


 





tramadol Allergy (Intermediate, Verified 11/11/17 17:40)


 AMS


Penicillins Allergy (Mild, Verified 11/11/17 17:40)


 rash


Sulfa (Sulfonamide Antibiotics) Allergy (Mild, Verified 11/11/17 17:40)


 rash


aspirin Allergy (Verified 11/11/17 17:40)


 











Past Medical History





- Social History


Smoking Status: Current Every Day Smoker


Cigarette use (# per day): Yes


Chew tobacco use (# tins/day): No


Smoking Education Provided: No


Family History: CAD, CVA, DM, Hyperlipidemia, Hypertension, Malignancy, Thyroid 

Disfunction, Other


Patient has suicidal ideation: No


Patient has homicidal ideation: No





- Past Medical History


Cardiac Medical History: Reports: Hx Atrial Fibrillation, Hx 

Hypercholesterolemia


   Denies: Hx Coronary Artery Disease, Hx Heart Attack, Hx Hypertension


Pulmonary Medical History: Reports: Hx Asthma, Hx Bronchitis


   Denies: Hx COPD, Hx Pneumonia


Neurological Medical History: Denies: Hx Cerebrovascular Accident, Hx Seizures


Endocrine Medical History: Reports: Hx Diabetes Mellitus Type 2 - borderline, 

Hx Hypothyroidism


Renal/ Medical History: Denies: Hx Peritoneal Dialysis


GI Medical History: Reports: Hx Gastritis, Hx Gastroesophageal Reflux Disease


Musculoskeltal Medical History: Reports Hx Arthritis, Reports Hx 

Musculoskeletal Trauma


Psychiatric Medical History: Reports: Hx Anxiety, Hx Depression - anxiety, Hx 

Schizophrenia


Past Surgical History: Reports: Hx Cholecystectomy, Hx Tubal Ligation.  Denies: 

Hx Hysterectomy





- Immunizations


Immunizations up to date: Yes


Hx Diphtheria, Pertussis, Tetanus Vaccination: Yes - 2013





Review of Systems





- Review of Systems


Notes: 





REVIEW OF SYSTEMS:


CONSTITUTIONAL :  Denies fever,  chills, or sweats.  Denies recent illness.


EENT:   Denies eye, ear, throat, or mouth pain or symptoms.  Denies nasal or 

sinus congestion or discharge.  Denies throat, tongue, or mouth swelling or 

difficulty swallowing.


CARDIOVASCULAR:  Denies chest pain.  Denies palpitations or racing or irregular 

heart beat.  Denies ankle edema.


RESPIRATORY:  Denies cough, cold, or chest congestion.  Denies shortness of 

breath, difficulty breathing, or wheezing.


GASTROINTESTINAL: admits to nausea vomiting diarrhea 


GENITOURINARY:  Denies difficulty urinating, painful urination, burning, 

frequency, blood in urine, or discharge.


FEMALE  GENITOURINARY:  Denies vaginal bleeding, heavy or abnormal periods, 

irregular periods.  Denies vaginal discharge or odor. 


MUSCULOSKELETAL:  Denies back or neck pain or stiffness.  Denies joint pain or 

swelling.


SKIN:   Denies rash, lesions or sores.


HEMATOLOGIC :   Denies easy bruising or bleeding.


LYMPHATIC:  Denies swollen, enlarged glands.


NEUROLOGICAL:  Denies confusion or altered mental status.  Denies passing out 

or loss of consciousness.  Denies dizziness or lightheadedness.  Denies 

headache.  Denies weakness or paralysis or loss of use of either side.  Denies 

problems with gait or speech.  Denies sensory loss, numbness, or tingling.  

Denies seizures.


PSYCHIATRIC:  Denies anxiety or stress.  Denies depression, suicidal ideation, 

or homicidal ideation.





ALL OTHER SYSTEMS REVIEWED AND NEGATIVE.











PHYSICAL EXAMINATION:





GENERAL: Well-appearing, well-nourished and in no acute distress.





HEAD: Atraumatic, normocephalic.





EYES: Pupils equal round and reactive to light, extraocular movements intact, 

conjunctiva are normal.





ENT: Nares patent, oropharynx clear without exudates.  Moist mucous membranes.





NECK: Normal range of motion, supple without lymphadenopathy





LUNGS: Breath sounds clear to auscultation bilaterally and equal.  No wheezes 

rales or rhonchi.





HEART: Regular rate and rhythm without murmurs





ABDOMEN: Soft, nontender, nondistended abdomen.  No guarding, no rebound.  No 

masses appreciated.





Female : deferred





Musculoskeletal: Normal range of motion, no pitting or edema.  No cyanosis.





NEUROLOGICAL: Cranial nerves grossly intact.  Normal speech, normal gait.  

Normal sensory, motor exams





PSYCH: Normal mood, normal affect.





SKIN: Warm, Dry, normal turgor, no rashes or lesions noted.

















Dictation was performed using Dragon voice recognition software





Physical Exam





- Vital signs


Vitals: 


 











Temp Pulse Resp BP Pulse Ox


 


 97.8 F   93   18   141/53 H  98 


 


 11/11/17 17:40  11/11/17 17:40  11/11/17 17:40  11/11/17 17:40  11/11/17 17:40














Course





- Re-evaluation


Re-evalutation: 





11/11/17 18:43


pts exam was extremely benign, she will have lab work and iv fluids and nausea 

control 


11/11/17 20:38


Patient wishes to be discharged prior to CMP resulting, she will be given 

another dose of Zofran here and given a dispensed pack for home





She otherwise looks well is in no distress





After performing a Medical Screening Examination, I estimate there is LOW risk 

for ACUTE APPENDICITIS, BOWEL OBSTRUCTION, ACUTE CHOLECYSTITIS, PERFORATED 

DIVERTICULITIS, INCARCERATED HERNIA, PANCREATITIS, PELVIC INFLAMMATORY DISEASE, 

PERFORATED ULCER, ECTOPIC PREGNANCY, or TUBO-OVARIAN ABSCESS, thus I consider 

the discharge disposition reasonable. Also, there is no evidence or peritonitis

, sepsis, or toxicity. I have reevaluated this patient multiple times and no 

significant life threatening changes are noted. The patient and I have 

discussed the diagnosis and risks, and we agree with discharging home with 

close follow-up with the understanding that symptoms and presentations can 

change. We also discussed returning to the Emergency Department immediately if 

new or worsening symptoms occur. We have discussed the symptoms which are most 

concerning (e.g., bloody stool, fever, changing or worsening pain, vomiting) 

that necessitate immediate return.





- Vital Signs


Vital signs: 


 











Temp Pulse Resp BP Pulse Ox


 


 97.8 F   93   18   141/53 H  98 


 


 11/11/17 17:40  11/11/17 17:40  11/11/17 17:40  11/11/17 17:40  11/11/17 17:40














- Laboratory


Result Diagrams: 


 11/11/17 18:40





 11/11/17 18:40





Discharge





- Discharge


Clinical Impression: 


 Nausea vomiting and diarrhea





Condition: Stable


Disposition: HOME, SELF-CARE


Instructions:  Vomiting ([facility])


Additional Instructions: 


Follow up with your physician tomorrow for further care or return to the ED 

IMMEDIATELY if symptoms worsen or new concerns occur. If you cannot afford to 

follow up with your primary care physician a list of low cost clinics have been 

provided at the end of your discharge papers as well.

## 2017-11-12 NOTE — PDOC H&P
History of Present Illness


Admission Date/PCP: 


  11/12/17 14:24





  





Patient complains of: Nausea vomiting and diarrhea for 4 days


History of Present Illness: 


Patient is a 51-year-old  woman with history of diabetes type 2, 

hypertension, COPD and hypertension presented to the hospital with complaint of 

nausea, vomiting and diarrhea that started since Thursday last week.  She 

presented to the ER on 11/11/17 and she was given IV fluid, she left before the 

labs results.  Her labs revealed BUN of 37 and creatinine of 5.4. Therefore she 

was called back today to be admitted.  She reports having 4-5 episodes of non-

bloody loose diarrhea along with 4 episodes of vomiting, and crampy mid 

abdominal pain.  She takes Lasix 40 mg twice a day and states to have not been 

able to keep anything down. She denies having any fever but reports chills.  No 

recent antibiotic use, no sick contact and no changes in diet either. She has 

not had any diarrhea no vomiting since yesterday but feeling nauseous. She has 

not been able to eat and she denies having any similar episodes.  She has no 

evidence of leukocytosis.  Hospitalist consulted for admission for acute kidney 

injury. 








Past Medical History


Cardiac Medical History: Reports: Atrial Fibrillation, Hyperlipidema


   Denies: Coronary Artery Disease, Myocardial Infarction, Hypertension


Pulmonary Medical History: Reports: Asthma, Bronchitis


   Denies: Chronic Obstructive Pulmonary Disease (COPD), Pneumonia


Neurological Medical History: 


   Denies: Seizures


Endocrine Medical History: Reports: Diabetes Mellitus Type 2 - borderline, 

Hypothyroidism


GI Medical History: Reports: Gastroesophageal Reflux Disease


Musculoskeltal Medical History: Reports: Arthritis


Psychiatric Medical History: Reports: Depression - anxiety


Hematology: 


   Denies: Anemia





Past Surgical History


Past Surgical History: Reports: Cholecystectomy, Tubal Ligation


   Denies: Hysterectomy





Social History


Information Source: Patient


Smoking Status: Former Smoker


Frequency of Alcohol Use: None


Hx Recreational Drug Use: No


Drugs: None


Hx Prescription Drug Abuse: No





- Advance Directive


Resuscitation Status: Full Code





Family History


Family History: CAD, CVA, DM, Hyperlipidemia, Hypertension, Malignancy, Thyroid 

Disfunction, Other


Parental Family History Reviewed: Yes


Children Family History Reviewed: Yes


Sibling(s) Family History Reviewed.: Yes





Medication/Allergy


Home Medications: 








Amitriptyline HCl [Elavil 25 mg Tablet] 50 mg PO QHS 11/12/17 


Furosemide [Lasix 40 mg Tablet] 80 mg PO DAILY 11/12/17 


Metformin HCl [Glucophage] 1,000 mg PO BID 11/12/17 


Zolpidem Tartrate [Ambien] 10 mg PO QHS 11/12/17 








Allergies/Adverse Reactions: 


 





tramadol Allergy (Intermediate, Verified 11/12/17 11:44)


 AMS


Penicillins Allergy (Mild, Verified 11/12/17 11:44)


 rash


Sulfa (Sulfonamide Antibiotics) Allergy (Mild, Verified 11/12/17 11:44)


 rash


aspirin Allergy (Verified 11/12/17 11:44)


 











Review of Systems


Constitutional: PRESENT: chills.  ABSENT: fever(s), headache(s), weight gain, 

weight loss


Eyes: ABSENT: visual disturbances


Ears: ABSENT: hearing changes


Cardiovascular: ABSENT: chest pain, dyspnea on exertion, edema, orthropnea, 

palpitations


Respiratory: ABSENT: cough, hemoptysis


Gastrointestinal: PRESENT: as per HPI, abdominal pain, nausea, vomiting.  ABSENT

: constipation, diarrhea, hematemesis, hematochezia


Genitourinary: ABSENT: dysuria, hematuria


Musculoskeletal: ABSENT: joint swelling


Integumentary: ABSENT: rash, wounds


Neurological: ABSENT: abnormal gait, abnormal speech, confusion, dizziness, 

focal weakness, syncope


Psychiatric: ABSENT: anxiety, depression, homidical ideation, suicidal ideation


Endocrine: ABSENT: cold intolerance, heat intolerance, polydipsia, polyuria


Hematologic/Lymphatic: ABSENT: easy bleeding, easy bruising





Physical Exam


Vital Signs: 


 











Temp Pulse Resp BP Pulse Ox


 


 98.1 F   98   16   127/63 H  95 


 


 11/12/17 11:44  11/12/17 11:44  11/12/17 11:44  11/12/17 11:44  11/12/17 11:44











General appearance: PRESENT: no acute distress, morbidly obese, other - Looks 

older than stated age


Head exam: PRESENT: atraumatic, normocephalic


Eye exam: PRESENT: conjunctiva pink, EOMI, PERRLA.  ABSENT: scleral icterus


Mouth exam: PRESENT: dry mucosa, tongue midline


Neck exam: ABSENT: JVD, lymphadenopathy, thyromegaly


Respiratory exam: PRESENT: clear to auscultation pallavi.  ABSENT: rales, rhonchi, 

wheezes


Cardiovascular exam: PRESENT: RRR.  ABSENT: diastolic murmur, rubs, systolic 

murmur


Vascular exam: PRESENT: normal capillary refill


GI/Abdominal exam: PRESENT: normal bowel sounds, soft, tenderness - mid 

abdomen.  ABSENT: distended, guarding, mass, organolmegaly, rebound


Rectal exam: PRESENT: deferred


Extremities exam: PRESENT: full ROM.  ABSENT: calf tenderness, clubbing, pedal 

edema


Neurological exam: PRESENT: alert, awake, oriented to person, oriented to place

, oriented to time, oriented to situation.  ABSENT: motor sensory deficit


Psychiatric exam: PRESENT: appropriate affect, normal mood.  ABSENT: homicidal 

ideation, suicidal ideation


Skin exam: PRESENT: dry, intact, warm.  ABSENT: cyanosis, rash





Results


Laboratory Results: 





 











  11/12/17 11/12/17





  13:03 13:03


 


WBC  6.1 


 


RBC  3.90 


 


Hgb  12.1 


 


Hct  35.9 L 


 


MCV  92 


 


Plt Count  279 


 


Sodium   143.1


 


Potassium   3.2 L


 


Chloride   100


 


Carbon Dioxide   25


 


Anion Gap   18


 


BUN   37 H


 


Creatinine   4.60 H


 


Est GFR ( Amer)   12 L


 


Est GFR (Non-Af Amer)   10 L


 


Glucose   101


 


Calcium   8.6











Assessment & Plan





- Diagnosis


(1) Acute kidney injury


Is this a current diagnosis for this admission?: Yes   


Plan: 


This is likely secondary to dehydration exacerbated by diuretics


Hold Lasix


Creatinine 5.4 and BUN 37 on the day prior


She was given some IV fluid with improvement


Therefore at this time continue IV fluid and will hold on renal imaging








(2) Gastroenteritis


Is this a current diagnosis for this admission?: Yes   


Plan: 


Likely viral


No evidence of fever no leukocytosis


Supportive care


If any further episode of diarrhea will culture stools








(3) Nausea vomiting and diarrhea


Is this a current diagnosis for this admission?: Yes   


Plan: 


Antiemetics as needed


IV fluid hydration








(4) Hypokalemia


Is this a current diagnosis for this admission?: Yes   


Plan: 


Add potassium supplement and check mag level








(5) Hypertension


Is this a current diagnosis for this admission?: Yes   


Plan: 


Hold diuretics








(6) Type 2 diabetes mellitus


Qualifiers: 


   Chronic kidney disease stage: unspecified stage 


Is this a current diagnosis for this admission?: Yes   


Plan: 


Hold metformin


Monitor blood sugar and add sliding scale








(7) Possible urinary tract infection


Is this a current diagnosis for this admission?: Yes   


Plan: 


Given ceftriaxone in the ED and urine culture sent


Continue antibiotics pending cultures 








(8) DVT prophylaxis


Is this a current diagnosis for this admission?: Yes   


Plan: 


Heparin SQ








- Time


Time Spent: 50 to 70 Minutes


Within: Other - Per improvement of her renal function





- Inpatient Certification


Medical Necessity: Need For IV Fluids

## 2017-11-12 NOTE — ER DOCUMENT REPORT
ED General





- General


Chief Complaint: Vomiting


Stated Complaint: VOMITING


Time Seen by Provider: 11/12/17 11:41


Mode of Arrival: Ambulatory


Information source: Patient


Notes: 





51 yr old female presents with day 4 of nausea and vomtiing, pt was seen by 

myself yesterday, given iv fluids felt better and left before cmp result. pt 

noted on lab review this morning to have elevated creatinine. Pt was called 

multiple times and then  sent to do a well check. pt admits she has not 

drank since she left last night but does not want to be admitted 


TRAVEL OUTSIDE OF THE [place] IN LAST 30 DAYS: No





- HPI


Onset: Last week


Onset/Duration: Persistent


Quality of pain: Achy


Severity: Mild


Pain Level: 1


Associated symptoms: Diarrhea, Nausea, Vomiting


Exacerbated by: Denies


Relieved by: Denies


Similar symptoms previously: Yes


Recently seen / treated by doctor: Yes





- Related Data


Allergies/Adverse Reactions: 


 





tramadol Allergy (Intermediate, Verified 11/12/17 11:44)


 AMS


Penicillins Allergy (Mild, Verified 11/12/17 11:44)


 rash


Sulfa (Sulfonamide Antibiotics) Allergy (Mild, Verified 11/12/17 11:44)


 rash


aspirin Allergy (Verified 11/12/17 11:44)


 








Home Medications: 


 Current Home Medications





Amitriptyline HCl [Elavil 25 mg Tablet] 50 mg PO QHS 11/12/17 [History]


Furosemide [Lasix 40 mg Tablet] 80 mg PO DAILY 11/12/17 [History]


Metformin HCl [Glucophage] 1,000 mg PO BID 11/12/17 [History]


Zolpidem Tartrate [Ambien] 10 mg PO QHS 11/12/17 [History]











Past Medical History





- Social History


Smoking Status: Current Every Day Smoker


Cigarette use (# per day): Yes


Chew tobacco use (# tins/day): No


Smoking Education Provided: No


Family History: CAD, CVA, DM, Hyperlipidemia, Hypertension, Malignancy, Thyroid 

Disfunction, Other


Patient has suicidal ideation: No


Patient has homicidal ideation: No





- Past Medical History


Cardiac Medical History: Reports: Hx Atrial Fibrillation, Hx 

Hypercholesterolemia


   Denies: Hx Coronary Artery Disease, Hx Heart Attack, Hx Hypertension


Pulmonary Medical History: Reports: Hx Asthma, Hx Bronchitis


   Denies: Hx COPD, Hx Pneumonia


Neurological Medical History: Denies: Hx Cerebrovascular Accident, Hx Seizures


Endocrine Medical History: Reports: Hx Diabetes Mellitus Type 2 - borderline, 

Hx Hypothyroidism


Renal/ Medical History: Denies: Hx Peritoneal Dialysis


GI Medical History: Reports: Hx Gastritis, Hx Gastroesophageal Reflux Disease


Musculoskeltal Medical History: Reports Hx Arthritis, Reports Hx 

Musculoskeletal Trauma


Psychiatric Medical History: Reports: Hx Anxiety, Hx Depression - anxiety, Hx 

Schizophrenia


Past Surgical History: Reports: Hx Cholecystectomy, Hx Tubal Ligation.  Denies: 

Hx Hysterectomy





- Immunizations


Immunizations up to date: Yes


Hx Diphtheria, Pertussis, Tetanus Vaccination: Yes - 2013





Review of Systems





- Review of Systems


Notes: 





REVIEW OF SYSTEMS:


CONSTITUTIONAL :  Denies fever,  chills, or sweats.  Denies recent illness.


EENT:   Denies eye, ear, throat, or mouth pain or symptoms.  Denies nasal or 

sinus congestion or discharge.  Denies throat, tongue, or mouth swelling or 

difficulty swallowing.


CARDIOVASCULAR:  Denies chest pain.  Denies palpitations or racing or irregular 

heart beat.  Denies ankle edema.


RESPIRATORY:  Denies cough, cold, or chest congestion.  Denies shortness of 

breath, difficulty breathing, or wheezing.


GASTROINTESTINAL: Admits to nausea vomiting diarrhea


GENITOURINARY:  Denies difficulty urinating, painful urination, burning, 

frequency, blood in urine, or discharge.


FEMALE  GENITOURINARY:  Denies vaginal bleeding, heavy or abnormal periods, 

irregular periods.  Denies vaginal discharge or odor. 


MUSCULOSKELETAL:  Denies back or neck pain or stiffness.  Denies joint pain or 

swelling.


SKIN:   Denies rash, lesions or sores.


HEMATOLOGIC :   Denies easy bruising or bleeding.


LYMPHATIC:  Denies swollen, enlarged glands.


NEUROLOGICAL:  Denies confusion or altered mental status.  Denies passing out 

or loss of consciousness.  Denies dizziness or lightheadedness.  Denies 

headache.  Denies weakness or paralysis or loss of use of either side.  Denies 

problems with gait or speech.  Denies sensory loss, numbness, or tingling.  

Denies seizures.


PSYCHIATRIC:  Denies anxiety or stress.  Denies depression, suicidal ideation, 

or homicidal ideation.





ALL OTHER SYSTEMS REVIEWED AND NEGATIVE.











PHYSICAL EXAMINATION:





GENERAL: Well-appearing, well-nourished and in no acute distress.





HEAD: Atraumatic, normocephalic.





EYES: Pupils equal round and reactive to light, extraocular movements intact, 

conjunctiva are normal.





ENT: Nares patent, oropharynx clear without exudates.  Moist mucous membranes.





NECK: Normal range of motion, supple without lymphadenopathy





LUNGS: Breath sounds clear to auscultation bilaterally and equal.  No wheezes 

rales or rhonchi.





HEART: Regular rate and rhythm without murmurs





ABDOMEN: Soft, nontender, nondistended abdomen.  No guarding, no rebound.  No 

masses appreciated.





Female : deferred





Musculoskeletal: Normal range of motion, no pitting or edema.  No cyanosis.





NEUROLOGICAL: Cranial nerves grossly intact.  Normal speech, normal gait.  

Normal sensory, motor exams





PSYCH: Normal mood, normal affect.





SKIN: Warm, Dry, normal turgor, no rashes or lesions noted.

















Dictation was performed using Dragon voice recognition software





Physical Exam





- Vital signs


Vitals: 


 











Temp Pulse Resp BP Pulse Ox


 


 98.1 F   98   16   127/63 H  95 


 


 11/12/17 11:44  11/12/17 11:44  11/12/17 11:44  11/12/17 11:44  11/12/17 11:44














Course





- Re-evaluation


Re-evalutation: 





11/12/17 12:12


Patient again overall looks quite well however her creatinine yesterday was 5.4 

therefore I did request she come back for further evaluation IV fluids will be 

started


11/12/17 15:09


Creatinine today was 4.6, patient will be admitted for acute renal failure and 

will be given IV fluids, ultrasound-guided IV was placed in left antecubital


11/12/17 15:09


Patient is noted to have urinary tract infection IV antibiotics have been 

ordered





- Vital Signs


Vital signs: 


 











Temp Pulse Resp BP Pulse Ox


 


 98.1 F   98   16   127/63 H  95 


 


 11/12/17 11:44  11/12/17 11:44  11/12/17 11:44  11/12/17 11:44  11/12/17 11:44














- Laboratory


Result Diagrams: 


 11/12/17 13:03





 11/12/17 13:03


Laboratory results interpreted by me: 


 











  11/12/17 11/12/17 11/12/17





  13:03 13:03 13:15


 


Hct  35.9 L  


 


Potassium   3.2 L 


 


BUN   37 H 


 


Creatinine   4.60 H 


 


Est GFR ( Amer)   12 L 


 


Est GFR (Non-Af Amer)   10 L 


 


Urine Protein    30 H


 


Urine Ketones    TRACE H


 


Urine Blood    SMALL H


 


Ur Leukocyte Esterase    LARGE H














Critical Care Note





- Critical Care Note


Total time excluding time spent on procedures (mins): 40


Comments: 





40 minutes of critical care time spent in direct contact evaluating and 

reevaluating the patient, treating symptoms, reviewing labs and studies and 

speaking with family  and consultants excluding any procedures





Discharge





- Discharge


Clinical Impression: 


 Nausea vomiting and diarrhea





Acute renal failure


Qualifiers:


 Acute renal failure type: unspecified Qualified Code(s): N17.9 - Acute kidney 

failure, unspecified





UTI (urinary tract infection)


Qualifiers:


 Urinary tract infection type: acute cystitis Hematuria presence: without 

hematuria Qualified Code(s): N30.00 - Acute cystitis without hematuria





Condition: Fair


Disposition: ADMITTED AS INPATIENT


Admitting Provider: Hospitalist


Unit Admitted: Telemetry

## 2017-11-13 NOTE — PDOC PROGRESS REPORT
Subjective


Progress Note for:: 11/13/17


Subjective:: 


Patient is a 51-year-old  woman with history of diabetes type 2, 

hypertension, COPD and hypertension presented to the hospital with complaint of 

nausea, vomiting and diarrhea that started since Thursday last week. She 

presented to the ER on 11/11/17 and she was given IV fluid, she left before the 

labs results.  Her labs revealed BUN of 37 and creatinine of 5.4. Therefore she 

was called back on 11/12/17 to be admitted.  She reports having 4-5 episodes of 

non-bloody loose diarrhea along with 4 episodes of vomiting, and crampy mid 

abdominal pain.  She takes Lasix 40 mg twice a day and states to have not been 

able to keep anything down. She denies having any fever but reports chills.  No 

recent antibiotic use, no sick contact and no changes in diet either. She has 

not had any diarrhea no vomiting since the day before admission but feeling 

nauseous. She has not been able to eat and she denies having any similar 

episodes.  She has no evidence of leukocytosis.  Hospitalist consulted for 

admission for acute kidney injury. 


Seen and examined this am, she reports feeling better no reports of vomiting no 

diarrhea.  Abdominal pain much better.  She was sitting eating breakfast. 








Physical Exam


Vital Signs: 


 











Temp Pulse Resp BP Pulse Ox


 


 98.1 F   82   20   104/50 L  96 


 


 11/13/17 07:26  11/13/17 07:26  11/13/17 07:26  11/13/17 07:26  11/13/17 07:26








 Intake & Output











 11/12/17 11/13/17 11/14/17





 06:59 06:59 06:59


 


Intake Total  2824 


 


Output Total  600 


 


Balance  2224 


 


Weight  104.5 kg 











General appearance: PRESENT: no acute distress, obese, other - looks older than 

stated


Head exam: PRESENT: atraumatic, normocephalic


Eye exam: PRESENT: conjunctiva pink, EOMI.  ABSENT: scleral icterus


Mouth exam: PRESENT: moist, tongue midline


Teeth exam: PRESENT: edentulous


Neck exam: ABSENT: JVD


Respiratory exam: PRESENT: clear to auscultation pallavi.  ABSENT: rales, rhonchi, 

wheezes


Cardiovascular exam: PRESENT: RRR.  ABSENT: diastolic murmur, rubs, systolic 

murmur


Vascular exam: PRESENT: normal capillary refill


GI/Abdominal exam: PRESENT: normal bowel sounds, soft.  ABSENT: distended, 

guarding, mass, organolmegaly, rebound, tenderness


Rectal exam: PRESENT: deferred


Extremities exam: PRESENT: full ROM.  ABSENT: calf tenderness, clubbing, pedal 

edema


Neurological exam: PRESENT: alert, awake, oriented to person, oriented to place

, oriented to time, oriented to situation.  ABSENT: motor sensory deficit


Psychiatric exam: PRESENT: appropriate affect, normal mood.  ABSENT: homicidal 

ideation, suicidal ideation


Skin exam: PRESENT: dry, intact, warm.  ABSENT: cyanosis, rash





Results


Laboratory Results: 


 





 11/13/17 10:41 





 11/13/17 10:41 





 











  11/13/17 11/13/17 11/13/17





  08:31 08:31 10:41


 


WBC  Cancelled  


 


RBC  Cancelled  


 


Hgb  Cancelled  


 


Hct  Cancelled  


 


MCV  Cancelled  


 


MCH  Cancelled  


 


MCHC  Cancelled  


 


RDW  Cancelled  


 


Plt Count  Cancelled  


 


Seg Neutrophils %  Cancelled  


 


Lymphocytes %  Cancelled  


 


Monocytes %  Cancelled  


 


Eosinophils %  Cancelled  


 


Basophils %  Cancelled  


 


Absolute Neutrophils  Cancelled  


 


Absolute Lymphocytes  Cancelled  


 


Absolute Monocytes  Cancelled  


 


Absolute Eosinophils  Cancelled  


 


Absolute Basophils  Cancelled  


 


Sodium   Cancelled  144.0


 


Potassium   Cancelled  4.3  D


 


Chloride   Cancelled  110 H


 


Carbon Dioxide   Cancelled  22


 


Anion Gap   Cancelled  12


 


BUN   Cancelled  22 H


 


Creatinine   Cancelled  2.18 H


 


Est GFR ( Amer)   Cancelled  29 L


 


Est GFR (Non-Af Amer)   Cancelled  24 L


 


Glucose   Cancelled  96


 


Calcium   Cancelled  8.2 L


 


Magnesium   Cancelled  1.7














  11/13/17





  10:41


 


WBC  4.7


 


RBC  3.33 L


 


Hgb  10.3 L


 


Hct  30.7 L


 


MCV  92


 


MCH  30.8


 


MCHC  33.4


 


RDW  13.3


 


Plt Count  240


 


Seg Neutrophils %  55.9


 


Lymphocytes %  34.5


 


Monocytes %  8.4


 


Eosinophils %  0.2


 


Basophils %  1.0


 


Absolute Neutrophils  2.7


 


Absolute Lymphocytes  1.6


 


Absolute Monocytes  0.4


 


Absolute Eosinophils  0.0


 


Absolute Basophils  0.0


 


Sodium 


 


Potassium 


 


Chloride 


 


Carbon Dioxide 


 


Anion Gap 


 


BUN 


 


Creatinine 


 


Est GFR ( Amer) 


 


Est GFR (Non-Af Amer) 


 


Glucose 


 


Calcium 


 


Magnesium 














Assessment & Plan





- Diagnosis


(1) Acute kidney injury


Is this a current diagnosis for this admission?: Yes   


Plan: 


This is likely secondary to dehydration exacerbated by diuretics


Continue to hold Lasix 


Creatinine 2.2 and BUN 22 


Continue IV fluid hydration and strict intake and output


Renally dosed all medications








(2) Gastroenteritis


Is this a current diagnosis for this admission?: Yes   


Plan: 


Likely viral


IV fluid and supportive care











(3) Nausea vomiting and diarrhea


Is this a current diagnosis for this admission?: Yes   


Plan: 


Antiemetics as needed


IV fluid hydration








(4) Hypokalemia


Is this a current diagnosis for this admission?: Yes   


Plan: 


Resolved 








(5) Hypertension


Is this a current diagnosis for this admission?: Yes   


Plan: 


Hold diuretics








(6) Type 2 diabetes mellitus


Qualifiers: 


   Chronic kidney disease stage: unspecified stage 


Is this a current diagnosis for this admission?: Yes   


Plan: 


Hold metformin


BS at goal


Monitor blood sugar and add sliding scale








(7) Possible urinary tract infection


Is this a current diagnosis for this admission?: Yes   


Plan: 


Given ceftriaxone in the ED and urine culture sent and pending 


Continue antibiotics pending cultures. On PO cipro








(8) Paroxysmal A-fib


Is this a current diagnosis for this admission?: Yes   


Plan: 


Seen cards last month 


Commended sleep study and anticoagulation 


She states that she could not afford


Currently NSR on cardiac monitor 








(9) Hypomagnesemia


Is this a current diagnosis for this admission?: Yes   


Plan: 


Resolved 








(10) DVT prophylaxis


Is this a current diagnosis for this admission?: Yes   


Plan: 


Heparin SQ








- Time


Time Spent with patient: 25-34 minutes


Within: Other - Depending on her renal function

## 2017-11-14 NOTE — PDOC PROGRESS REPORT
Subjective


Subjective:: 





51-year-old female who presented to the ED with nausea, vomiting, diarrhea 

found to have acute renal failure.  She is doing much better.  Appetite is 

better and she is drinking well.  She is making lots of urine and would like 

trial of discontinuing IV fluid.  No further nausea or vomiting, no abdominal 

pain, denies fever or chills, no chest pain or shortness of breath or 

palpitations.





Physical Exam


Vital Signs: 


 











Temp Pulse Resp BP Pulse Ox


 


 98.0 F   88   18   124/73   96 


 


 11/14/17 11:21  11/14/17 11:21  11/14/17 11:21  11/14/17 11:21  11/14/17 11:21








 Intake & Output











 11/13/17 11/14/17 11/15/17





 06:59 06:59 06:59


 


Intake Total 2824 3295 


 


Output Total 600 150 


 


Balance 2224 3145 


 


Weight 104.5 kg 107 kg 107 kg











Exam: 





GENERAL: Well-developed, no acute distress


CARDIOVASCULAR: RRR, normal S1-S2


LUNGS: CTA bilaterally


ABDOMEN: Soft, NT, NL bowel sounds


EXTREMITIES: No edema, clubbing, cyanosis


NEUROLOGICAL: Alert, oriented x 3





Results


Laboratory Results: 


 





 11/14/17 06:25 





 11/14/17 07:04 





 











  11/14/17 11/14/17





  06:25 07:04


 


WBC  5.2 


 


RBC  3.04 L 


 


Hgb  9.5 L 


 


Hct  28.4 L 


 


MCV  94 


 


MCH  31.2 


 


MCHC  33.4 


 


RDW  13.4 


 


Plt Count  200 


 


Sodium   144.9


 


Potassium   3.8


 


Chloride   112 H


 


Carbon Dioxide   22


 


Anion Gap   11


 


BUN   12


 


Creatinine   1.45 H


 


Est GFR ( Amer)   46 L


 


Est GFR (Non-Af Amer)   38 L


 


Glucose   83


 


Calcium   8.4


 


Magnesium   1.3 L














Assessment & Plan





- Plan Summary


Plan Summary: 





(1) Acute kidney injury


Significantly improved.  Likely secondary to dehydration and exacerbated by 

diuretics


Will continue to hold Lasix 


DC IV fluid and encourage good fluid intake


Follow-up Chem-7 in a.m.  Likely DC home if continues to improve/normalize.





(2) Gastroenteritis


Likely viral.  Improved.








(3) Nausea vomiting and diarrhea


Improved


Continue antiemetics as needed


Advance diet as tolerated





(4) Hypokalemia


Resolved 





(5) Hypertension


Hold diuretics





(6) Type 2 diabetes mellitus


Hold metformin


BS at goal


Monitor blood sugar, sliding scale insulin as needed





(7) Possible urinary tract infection


Plan: 


Given ceftriaxone and Cipro


Follow urine culture results





(8) Paroxysmal A-fib


Saw cards last month 


Recommended sleep study and anticoagulation 


She states that she could not afford


Currently NSR on cardiac monitor 





(9) Hypomagnesemia


Resolved 





(10) DVT prophylaxis


Heparin SQ

## 2017-11-15 NOTE — PDOC PROGRESS REPORT
Subjective


Progress Note for:: 11/15/17


Subjective:: 


Follow-up visit for acute kidney injury due to gastroenteritis with significant 

nausea vomiting and diarrhea, in the patient with history of hypertension 

diabetes mellitus type 2 and A. fibrillation





The patient is a 51-year-old  lady with history of diabetes mellitus 

type 2, hypertension, COPD who presented to the hospital with complaints of 

nausea vomiting and diarrhea that started 1 week before.  She was initially 

seen on the emergency room on 11/11/17 and was given IV fluids and left the ER 

before the lab results.  She noted to have significant acute kidney injury on 

admission with a BUN of 37 and creatinine of 5.4.  She was called back on 11/12/ 2017 to be admitted of note the patient had been having 4-5 episodes of 

nonbloody loose stools with about 4 episodes of vomiting associated with crampy 

mid abdominal pain.  Additionally she was on Lasix 40 mg twice daily.  She was 

admitted and started on IV fluids.  Stool for C. difficile was negative.  Renal 

function has improved significantly.





Overnight events noted.  She reports significant improvement.  She still has 

some loose stools.  Denies chest pain, shortness of breath.  She has been 

afebrile.








Physical Exam


Vital Signs: 


 











Temp Pulse Resp BP Pulse Ox


 


 98.1 F   91   18   135/72 H  98 


 


 11/15/17 07:37  11/15/17 07:37  11/15/17 07:37  11/15/17 07:37  11/15/17 07:37








 Intake & Output











 11/14/17 11/15/17 11/16/17





 06:59 06:59 06:59


 


Intake Total 3295 1080 


 


Output Total 150 1770 


 


Balance 3145 -690 


 


Weight 107 kg 110.1 kg 











General appearance: PRESENT: no acute distress, cooperative, morbidly obese


Head exam: PRESENT: atraumatic, normocephalic


Respiratory exam: PRESENT: clear to auscultation pallavi, symmetrical, unlabored


Cardiovascular exam: PRESENT: RRR, +S1, +S2


GI/Abdominal exam: PRESENT: normal bowel sounds, soft


Neurological exam: PRESENT: alert, awake, oriented to person, oriented to place

, oriented to time, oriented to situation





Results


Laboratory Results: 


 





 11/15/17 06:43 





 11/15/17 06:43 





 











  11/15/17 11/15/17





  06:43 06:43


 


WBC   6.3


 


RBC   3.12 L


 


Hgb   9.6 L


 


Hct   29.0 L


 


MCV   93


 


MCH   30.9


 


MCHC   33.3


 


RDW   13.2


 


Plt Count   216


 


Seg Neutrophils %   53.3


 


Lymphocytes %   38.9


 


Monocytes %   5.7


 


Eosinophils %   0.3


 


Basophils %   1.8


 


Absolute Neutrophils   3.4


 


Absolute Lymphocytes   2.4


 


Absolute Monocytes   0.4


 


Absolute Eosinophils   0.0


 


Absolute Basophils   0.1


 


Sodium  147.3 H 


 


Potassium  3.7 


 


Chloride  112 H 


 


Carbon Dioxide  22 


 


Anion Gap  13 


 


BUN  12 


 


Creatinine  1.37 H 


 


Est GFR ( Amer)  49 L 


 


Est GFR (Non-Af Amer)  41 L 


 


Glucose  94 


 


Calcium  9.5 








 











  11/14/17 11/15/17 11/15/17





  07:04 06:43 06:43


 


WBC    6.3


 


Hgb    9.6 L


 


Plt Count    216


 


Sodium  144.9  147.3 H 


 


Chloride  112 H  112 H 


 


Creatinine  1.45 H  1.37 H 


 


Magnesium  1.3 L  











Assessment & Plan





- Diagnosis


(1) Acute kidney injury


Is this a current diagnosis for this admission?: Yes   


Plan: 


Due to dehydration secondary to gastroenteritis, and furosemide usage.


Continue to hold furosemide.  Continue IV fluids, D5 water, follow BMP.








(2) Gastroenteritis


Is this a current diagnosis for this admission?: Yes   


Plan: 


Possibly viral.  C. difficile negative.  Continue IV fluids as above, continue 

supportive care








(3) Hypertension


Qualifiers: 


   Hypertension type: essential hypertension   Qualified Code(s): I10 - 

Essential (primary) hypertension   


Is this a current diagnosis for this admission?: Yes   


Plan: 


Blood pressure at goal.  Continue holding Lasix








(4) Hypokalemia


Is this a current diagnosis for this admission?: Yes   


Plan: 


Replete and recheck








(5) Hypomagnesemia


Is this a current diagnosis for this admission?: Yes   


Plan: 


Replete and recheck








(6) Paroxysmal A-fib


Is this a current diagnosis for this admission?: Yes   


Plan: 


Has not been taking Apixaban due to lack of insurance.  consulted 

for financial assistance








(7) Possible urinary tract infection


Is this a current diagnosis for this admission?: Yes   


Plan: 


Improved.  Remains afebrile.  Continue oral ciprofloxacin








(8) Type 2 diabetes mellitus


Qualifiers: 


   Chronic kidney disease stage: unspecified stage 


Is this a current diagnosis for this admission?: Yes   


Plan: 


Not requiring insulin for control. Home Metformin held. Capillary glucose 

stable. Continue sliding scale Novolog








- Time


Time Spent with patient: 35 or more minutes


Anticipated discharge: Home with Homehealth





- Inpatient Certification


Medical Necessity: Risk of Complication if Not Cared For in Hospital

## 2017-11-16 NOTE — PDOC PROGRESS REPORT
Subjective


Progress Note for:: 11/16/17


Subjective:: 


Follow-up visit for acute kidney injury due to gastroenteritis with significant 

nausea vomiting and diarrhea, in the patient with history of hypertension 

diabetes mellitus type 2 and A. fibrillation





The patient is a 51-year-old  lady with history of diabetes mellitus 

type 2, hypertension, COPD who presented to the hospital with complaints of 

nausea vomiting and diarrhea that started 1 week before.  She was initially 

seen on the emergency room on 11/11/17 and was given IV fluids and left the ER 

before the lab results.  She noted to have significant acute kidney injury on 

admission with a BUN of 37 and creatinine of 5.4.  She was called back on 11/12/ 2017 to be admitted of note the patient had been having 4-5 episodes of 

nonbloody loose stools with about 4 episodes of vomiting associated with crampy 

mid abdominal pain.  Additionally she was on Lasix 40 mg twice daily.  She was 

admitted and started on IV fluids.  Stool for C. difficile was negative.  Renal 

function has improved significantly.





Overnight events noted.  She reports significant improvement.  She still has 

some loose stools.  Denies chest pain, shortness of breath.  She has been 

afebrile.








Physical Exam


Vital Signs: 


 











Temp Pulse Resp BP Pulse Ox


 


 98.3 F   84   20   116/69   95 


 


 11/16/17 07:50  11/16/17 07:50  11/16/17 07:50  11/16/17 07:50  11/16/17 07:50








 Intake & Output











 11/15/17 11/16/17 11/17/17





 06:59 06:59 06:59


 


Intake Total 1080 3450 


 


Output Total 1770 3400 


 


Balance -690 50 


 


Weight 110.1 kg 110.3 kg 











General appearance: PRESENT: no acute distress, cooperative, morbidly obese


Head exam: PRESENT: atraumatic, normocephalic


Respiratory exam: PRESENT: clear to auscultation pallavi, symmetrical, unlabored.  

ABSENT: accessory muscle use, chest wall tenderness, crackles, decreased breath 

sounds, prolonged expiratory phas, rales, retraction, rhonchi, stridor, 

tachypnea, wheezes, other


Cardiovascular exam: PRESENT: RRR, +S1, +S2.  ABSENT: bradycardia, clicks, 

diastolic murmur, gallop, irregular rhythm, rubs, systolic murmur, tachycardia, 

other


GI/Abdominal exam: PRESENT: normal bowel sounds, soft.  ABSENT: ascites, 

diminished bowel sounds, distended, firm, guarding, hernia, hyperactive bowel 

sounds, hypoactive bowel sounds, mass, Saldivar's sign, organolmegaly, rebound, 

rigid, tenderness, other


Extremities exam: PRESENT: pedal edema, +2 edema


Musculoskeletal exam: PRESENT: ambulatory, full ROM.  ABSENT: deformity, 

dislocation, normal inspection, tenderness, other


Neurological exam: PRESENT: alert, awake, oriented to person, oriented to place

, oriented to time, oriented to situation, reflexes normal, CN II-XII grossly 

intact


Psychiatric exam: PRESENT: depressed, flat affect





Results


Laboratory Results: 


 





 11/16/17 05:28 





 11/16/17 05:28 





 











  11/16/17 11/16/17





  05:28 05:28


 


WBC  5.8 


 


RBC  3.40 L 


 


Hgb  10.7 L 


 


Hct  31.4 L 


 


MCV  92 


 


MCH  31.4 


 


MCHC  34.0 


 


RDW  13.7 


 


Plt Count  263 


 


Sodium   145.2 H


 


Potassium   3.8


 


Chloride   108 H


 


Carbon Dioxide   23


 


Anion Gap   14


 


BUN   10


 


Creatinine   1.27 H


 


Est GFR ( Amer)   54 L


 


Est GFR (Non-Af Amer)   44 L


 


Glucose   93


 


Calcium   9.7


 


Magnesium   1.1 L*


 


Total Bilirubin   0.6


 


AST   40 H


 


ALT   41


 


Alkaline Phosphatase   98


 


Total Protein   6.0 L


 


Albumin   3.9








 











  11/12/17 11/16/17





  13:03 05:28


 


Creatinine  4.60 H  1.27 H











Assessment & Plan





- Diagnosis


(1) Acute kidney injury


Is this a current diagnosis for this admission?: Yes   


Plan: 


Due to dehydration secondary to gastroenteritis, and furosemide usage.


Significantly improved with IV fluids.  Will discontinue IV fluids and restart 

home furosemide 








(2) Gastroenteritis


Is this a current diagnosis for this admission?: Yes   


Plan: 


Possibly viral.  C. difficile negative.  Improved with supportive care








(3) Hypertension


Qualifiers: 


   Hypertension type: essential hypertension   Qualified Code(s): I10 - 

Essential (primary) hypertension   


Is this a current diagnosis for this admission?: Yes   





(4) Paroxysmal A-fib


Is this a current diagnosis for this admission?: Yes   


Plan: 


Currently normal sinus rhythm.  She follows with Dr. Eisenberg as an outpatient.  

Has not been taking Apixaban due to lack of insurance.  consulted 

for financial assistance








(5) Hypomagnesemia


Is this a current diagnosis for this admission?: Yes   


Plan: 


Replete and recheck








(6) Possible urinary tract infection


Is this a current diagnosis for this admission?: Yes   


Plan: 


Improved.  Remains afebrile.  Continue oral ciprofloxacin








(7) Type 2 diabetes mellitus


Qualifiers: 


   Chronic kidney disease stage: unspecified stage 


Is this a current diagnosis for this admission?: Yes   


Plan: 


Not requiring insulin for control. Home Metformin held. Capillary glucose 

stable. Continue sliding scale Novolog








(8) Hypokalemia


Is this a current diagnosis for this admission?: Yes   


Plan: 


Repleted








- Time


Time Spent with patient: 35 or more minutes


Anticipated discharge: Home





- Inpatient Certification


Medical Necessity: Risk of Complication if Not Cared For in Hospital - Plan for 

discharge home in a.m.

## 2017-11-17 NOTE — PDOC DISCHARGE SUMMARY
General





- Admit/Disc Date/PCP


Admission Date/Primary Care Provider: 


  11/12/17 14:31





  





Discharge Date: 11/17/17





- Discharge Diagnosis


(1) Acute kidney injury


Is this a current diagnosis for this admission?: Yes   





(2) Gastroenteritis


Is this a current diagnosis for this admission?: Yes   





(3) Hypertension


Is this a current diagnosis for this admission?: Yes   





(4) Paroxysmal A-fib


Is this a current diagnosis for this admission?: Yes   





(5) Hypomagnesemia


Is this a current diagnosis for this admission?: Yes   





(6) Possible urinary tract infection


Is this a current diagnosis for this admission?: Yes   





(7) Type 2 diabetes mellitus


Is this a current diagnosis for this admission?: Yes   





(8) Hypokalemia


Is this a current diagnosis for this admission?: Yes   





- Additional Information


Resuscitation Status: Full Code


Discharge Diet: As Tolerated, Cardiac


Discharge Activity: Activity As Tolerated


Home Medications: 








Amitriptyline HCl [Elavil 25 mg Tablet] 50 mg PO QHS 11/12/17 


Furosemide [Lasix 40 mg Tablet] 80 mg PO DAILY 11/12/17 


Metformin HCl [Glucophage] 1,000 mg PO BID 11/12/17 


Zolpidem Tartrate [Ambien] 10 mg PO QHS 11/12/17 











History of Present Illness


Patient complains of: Abnormal labs


History of Present Illness: 


EVERARDO GEORGE is a 51 year old female with history of diabetes mellitus type 2

, hypertension, COPD who presented to the hospital with complaints of nausea 

vomiting and diarrhea that started 1 week before.  She was initially seen in 

the emergency room on 11/11/2017 and was given IV fluids and then left the ER 

for the lab results.  She was noted to have significant acute kidney injury 

with a BUN of 37 and creatinine of 5.4.  She was called back on 11/12/2017 and 

was admitted.  Of note the patient reports having been getting about 4-5 

episodes of nonbloody loose stools with about 4 episodes of nausea and vomiting 

associated with crampy mid abdominal pain.  Additionally she was on Lasix.  








Hospital Course


Hospital Course: 


She was admitted and started on IV fluids.  Stool for C. difficile was 

negative.  Renal function has improved significantly. Creatinine on admission 

was 5.7, creatinine on discharge is 1.3.  At this point patient is requesting 

to be discharged home to follow up as an outpatient.  Of note the patient does 

not have a primary care physician and does not have private insurance.  Social 

worker was consulted and arranged for the patient to be followed at the [facility].  Additionally a coupon for 1 months treatment with Eliquis was given to 

the patient





Physical Exam


Vital Signs: 


 











Temp Pulse Resp BP Pulse Ox


 


 97.3 F   90   12   108/74   97 


 


 11/17/17 11:16  11/17/17 11:16  11/17/17 11:16  11/17/17 11:16  11/17/17 11:16








 Intake & Output











 11/16/17 11/17/17 11/18/17





 06:59 06:59 06:59


 


Intake Total 3450 1030 


 


Output Total 3400 1700 


 


Balance 50 -670 


 


Weight 110.3 kg 110.6 kg 











General appearance: PRESENT: no acute distress, cooperative, morbidly obese


Head exam: PRESENT: atraumatic, normocephalic


Eye exam: PRESENT: conjunctiva pink, EOMI


Respiratory exam: PRESENT: clear to auscultation pallavi, symmetrical, unlabored


Cardiovascular exam: PRESENT: RRR, +S1, +S2


GI/Abdominal exam: PRESENT: normal bowel sounds, soft.  ABSENT: ascites, 

diminished bowel sounds, distended, firm, guarding, hernia, hyperactive bowel 

sounds, hypoactive bowel sounds, mass, Saldivar's sign, organolmegaly, rebound, 

rigid, tenderness, other


Extremities exam: PRESENT: +1 edema


Neurological exam: PRESENT: alert, awake, oriented to person, oriented to place

, oriented to time, oriented to situation, reflexes normal, CN II-XII grossly 

intact


Psychiatric exam: PRESENT: depressed





Results


Laboratory Results: 


 





 11/17/17 05:40 





 11/17/17 05:40 





 











  11/17/17 11/17/17





  05:40 05:40


 


WBC  5.9 


 


RBC  3.37 L 


 


Hgb  10.4 L 


 


Hct  31.1 L 


 


MCV  92 


 


MCH  30.9 


 


MCHC  33.4 


 


RDW  13.4 


 


Plt Count  254 


 


Sodium   142.5


 


Potassium   3.5 L


 


Chloride   105


 


Carbon Dioxide   26


 


Anion Gap   12


 


BUN   13


 


Creatinine   1.30 H


 


Est GFR ( Amer)   52 L


 


Est GFR (Non-Af Amer)   43 L


 


Glucose   105


 


Calcium   9.7


 


Magnesium   1.6








 





11/14/17 14:30   Stool - Stool    - Final


11/14/17 14:30   Stool - Stool   Stool Culture - Final











Qualifiers


**PATEINT BEING DISCHARGED WITH ANY OF THE FOLLOWING DIAGNOSIS?: No





Plan


Time Spent: Greater than 30 Minutes

## 2017-12-20 NOTE — PDOC H&P
History of Present Illness


Admission Date/PCP: 


  12/20/17 05:20





  





Patient complains of: Abdominal pain and diarrhea


History of Present Illness: 


EVERARDO GEORGE is a 51 year old female with a past medical history of 

schizophrenia, diabetes, hypertension and recent acute renal failure with 

gastroenteritis 1 month ago.  She presents with abdominal pain and nonbloody 

diarrhea similar to previous presentation.  She denies fever chills but has had 

nausea without vomiting.  She believes she returned to baseline when was 

hydrated and taken off of Lasix and metformin.  In the emergency room she is 

found to have a creatinine of 6 and a CT of the abdomen pelvis with pancolitis.

  She receives IV fluids, symptomatic management and referred to the 

hospitalist for admission.








Past Medical History


Cardiac Medical History: Reports: Atrial Fibrillation, Hyperlipidema


   Denies: Coronary Artery Disease, Myocardial Infarction, Hypertension


Pulmonary Medical History: Reports: Asthma, Bronchitis


   Denies: Chronic Obstructive Pulmonary Disease (COPD), Pneumonia


Neurological Medical History: 


   Denies: Seizures


Endocrine Medical History: Reports: Diabetes Mellitus Type 2 - borderline, 

Hypothyroidism


GI Medical History: Reports: Gastroesophageal Reflux Disease


Musculoskeltal Medical History: Reports: Arthritis


Psychiatric Medical History: Reports: Depression, Schizoaffective Disorder


Hematology: 


   Denies: Anemia





Past Surgical History


Past Surgical History: Reports: Cholecystectomy, Tubal Ligation


   Denies: Hysterectomy





Social History


Information Source: Patient, [facility] Records


Lives with: Family


Smoking Status: Unknown if Ever Smoked


Frequency of Alcohol Use: None


Hx Recreational Drug Use: No


Drugs: None


Hx Prescription Drug Abuse: No





- Advance Directive


Resuscitation Status: Full Code





Family History


Family History: CAD, CVA, DM, Hyperlipidemia, Hypertension, Malignancy, Thyroid 

Disfunction, Other


Parental Family History Reviewed: Yes


Children Family History Reviewed: Yes


Sibling(s) Family History Reviewed.: Yes





Medication/Allergy


Home Medications: 








Amitriptyline HCl [Elavil 25 mg Tablet] 2 tab PO QHS 11/12/17 


Furosemide [Lasix 40 mg Tablet] 1 tab PO DAILY 11/12/17 


Metformin HCl [Glucophage] 1 tab PO BID 11/12/17 


Zolpidem Tartrate [Ambien] 1 tab PO QHS 11/12/17 


Alprazolam [Xanax Xr 1 mg Tablet Extended Release] 1 tab PO TID 12/20/17 


Levothyroxine Sodium [Synthroid 50 Mcg Tablet] 1 tab PO DAILY 12/20/17 








Allergies/Adverse Reactions: 


 





tramadol Allergy (Intermediate, Verified 12/20/17 01:09)


 AMS


Penicillins Allergy (Mild, Verified 12/20/17 01:09)


 rash


Sulfa (Sulfonamide Antibiotics) Allergy (Mild, Verified 12/20/17 01:09)


 rash


aspirin Allergy (Verified 12/20/17 01:09)


 











Review of Systems


Constitutional: PRESENT: as per HPI, anorexia, fatigue.  ABSENT: fever(s), 

headache(s), night sweats, weakness


Eyes: ABSENT: visual disturbances


Ears: ABSENT: hearing changes


Cardiovascular: ABSENT: chest pain, dyspnea on exertion, edema, orthropnea, 

palpitations


Respiratory: ABSENT: cough, hemoptysis


Gastrointestinal: PRESENT: as per HPI, abdominal pain, bloating, diarrhea, 

nausea.  ABSENT: coffee ground emesis, constipation, heartburn, hematemesis, 

hematochezia, melena, vomiting


Genitourinary: ABSENT: dysuria, hematuria


Musculoskeletal: ABSENT: joint swelling


Integumentary: ABSENT: rash, wounds


Neurological: ABSENT: abnormal gait, abnormal speech, confusion, dizziness, 

focal weakness, syncope


Psychiatric: ABSENT: anxiety, depression, homidical ideation, suicidal ideation


Endocrine: ABSENT: cold intolerance, heat intolerance, polydipsia, polyuria


Hematologic/Lymphatic: ABSENT: easy bleeding, easy bruising





Physical Exam


Vital Signs: 


 











Temp Pulse Resp BP Pulse Ox


 


 97.3 F   90   16   131/70 H  95 


 


 12/20/17 06:00  12/20/17 06:00  12/20/17 06:00  12/20/17 06:00  12/20/17 06:00








 Intake & Output











 12/18/17 12/19/17 12/20/17





 11:59 11:59 11:59


 


Weight   100 kg











General appearance: PRESENT: mild distress, obese, well-developed, well-

nourished


Head exam: PRESENT: atraumatic, normocephalic


Eye exam: PRESENT: conjunctiva pink, EOMI, PERRLA.  ABSENT: scleral icterus


Ear exam: PRESENT: normal external ear exam


Mouth exam: PRESENT: moist, tongue midline


Neck exam: ABSENT: carotid bruit, JVD, lymphadenopathy, thyromegaly


Respiratory exam: PRESENT: clear to auscultation pallavi.  ABSENT: rales, rhonchi, 

wheezes


Cardiovascular exam: PRESENT: RRR.  ABSENT: diastolic murmur, rubs, systolic 

murmur


Pulses: PRESENT: normal dorsalis pedis pul


Vascular exam: PRESENT: normal capillary refill


GI/Abdominal exam: PRESENT: distended, hyperactive bowel sounds, normal bowel 

sounds, soft, tenderness.  ABSENT: ascites, firm, guarding, mass, organolmegaly

, rebound


Rectal exam: PRESENT: deferred


Extremities exam: PRESENT: full ROM.  ABSENT: calf tenderness, clubbing, pedal 

edema


Neurological exam: PRESENT: alert, awake, oriented to person, oriented to place

, oriented to time, oriented to situation, CN II-XII grossly intact.  ABSENT: 

motor sensory deficit


Psychiatric exam: PRESENT: appropriate affect, normal mood.  ABSENT: homicidal 

ideation, suicidal ideation


Skin exam: PRESENT: dry, intact, warm.  ABSENT: cyanosis, rash





Results


Laboratory Results: 


 











  12/20/17





  05:30


 


Creatine Kinase  595 H











Impressions: 


 





Acute Abdomen Series  12/20/17 00:27


IMPRESSION:


 


No acute findings.


 


 


 2011 [employer]- All Rights Reserved


 








Abdomen/Pelvis CT  12/20/17 03:59


IMPRESSION:


Moderate diffuse colitis pattern. Infectious, inflammatory,


neoplastic processes are in the differential diagnosis.


 














Assessment & Plan





- Diagnosis


(1) Colitis


Is this a current diagnosis for this admission?: Yes   


Plan: 


Malignant versus infectious versus ulcerative.  Empiric Flagyl consider 

inpatient GI consult.  Follow-up CBC and ESR








(2) Acute renal failure


Qualifiers: 


   Acute renal failure type: unspecified   Qualified Code(s): N17.9 - Acute 

kidney failure, unspecified   


Is this a current diagnosis for this admission?: Yes   


Plan: 


Nonoliguric, complicated hypovolemia with diarrhea, diuretic and metformin.  

Patient responded previously to volume resuscitation and discontinuation of 

Lasix and metformin.  IV fluid challenge initiated , avoiding nephrotoxic meds 

and doses.  No evidence for obstruction consider nephrology consult








(3) Hypokalemia


Is this a current diagnosis for this admission?: Yes   


Plan: 


Secondary to diarrhea repletion and reevaluation of chemistry








(4) Hypomagnesemia


Is this a current diagnosis for this admission?: Yes   


Plan: 


Secondary to diarrhea repletion and reevaluation chemistry








(5) Nausea vomiting and diarrhea


Is this a current diagnosis for this admission?: Yes   


Plan: 


Secondary to colitis, symptomatic management








(6) Diabetes


Qualifiers: 


 


Is this a current diagnosis for this admission?: Yes   


Plan: 


Hold metformin sliding scale insulin








- Time


Time Spent: 50 to 70 Minutes





- Inpatient Certification


Medical Necessity: Need Close Monitoring Due to Risk of Patient Decompensation

## 2017-12-20 NOTE — PDOC PROGRESS REPORT
Subjective


Progress Note for:: 12/20/17


Subjective:: 





Complains of some nausea.


Reason For Visit: 


ARF, COLITIS








Physical Exam


Vital Signs: 


 











Temp Pulse Resp BP Pulse Ox


 


 98.4 F   87   18   116/52 L  97 


 


 12/20/17 11:51  12/20/17 11:51  12/20/17 11:51  12/20/17 11:51  12/20/17 11:51








 Intake & Output











 12/19/17 12/20/17 12/21/17





 06:59 06:59 06:59


 


Weight  100 kg 











General appearance: PRESENT: no acute distress


Eye exam: PRESENT: conjunctiva pink.  ABSENT: scleral icterus


Mouth exam: PRESENT: moist, tongue midline


Neck exam: ABSENT: JVD


Respiratory exam: PRESENT: clear to auscultation pallavi.  ABSENT: rales, rhonchi, 

wheezes


Cardiovascular exam: PRESENT: RRR.  ABSENT: diastolic murmur, rubs, systolic 

murmur


GI/Abdominal exam: PRESENT: normal bowel sounds, soft, tenderness - Mild 

diffuse tenderness..  ABSENT: distended, guarding, mass, organolmegaly, rebound


Extremities exam: ABSENT: calf tenderness, clubbing, pedal edema


Neurological exam: PRESENT: alert, awake, oriented to person, oriented to place

, oriented to time, oriented to situation, CN II-XII grossly intact.  ABSENT: 

motor sensory deficit


Psychiatric exam: PRESENT: appropriate affect


Skin exam: PRESENT: dry, intact, warm.  ABSENT: cyanosis, rash





Results


Laboratory Results: 


 











  12/20/17





  05:30


 


Creatine Kinase  595 H











Impressions: 


 





Acute Abdomen Series  12/20/17 00:27


IMPRESSION:


 


No acute findings.


 


 


 2011 [employer]- All Rights Reserved


 








Abdomen/Pelvis CT  12/20/17 03:59


IMPRESSION:


Moderate diffuse colitis pattern. Infectious, inflammatory,


neoplastic processes are in the differential diagnosis.


 














Assessment & Plan





- Diagnosis


(1) Acute renal failure


Qualifiers: 


   Acute renal failure type: unspecified   Qualified Code(s): N17.9 - Acute 

kidney failure, unspecified   


Is this a current diagnosis for this admission?: Yes   


Plan: 


Most likely secondary to decreased p.o. intake related to her abdominal 

symptoms.  We will continue with IV fluids.








(2) Colitis


Is this a current diagnosis for this admission?: Yes   


Plan: 


Has been started on Flagyl.








(3) Anxiety


Is this a current diagnosis for this admission?: Yes   


Plan: 


Continue with Elavil and Xanax.








(4) Hypertension


Qualifiers: 


   Hypertension type: essential hypertension   Qualified Code(s): I10 - 

Essential (primary) hypertension   


Is this a current diagnosis for this admission?: Yes   





(5) Hypothyroidism


Is this a current diagnosis for this admission?: Yes   


Plan: 


Patient has not been compliant with her Synthroid.  It has been restarted.








(6) Paroxysmal atrial fibrillation


Is this a current diagnosis for this admission?: Yes   


Plan: 


Patient is currently in a normal sinus rhythm.








(7) Schizoaffective disorder


Is this a current diagnosis for this admission?: Yes   


Plan: 


Continue with Xanax and Elavil.








(8) Type 2 diabetes mellitus


Qualifiers: 


   Chronic kidney disease stage: unspecified stage 


Is this a current diagnosis for this admission?: Yes   


Plan: 


Cover with sliding scale insulin.








- Time


Time Spent with patient: 25-34 minutes





- Inpatient Certification


Medical Necessity: Need For IV Fluids

## 2017-12-20 NOTE — ER DOCUMENT REPORT
ED General





- General


Chief Complaint: Nausea/Vomiting/Diarrhea


Stated Complaint: VOMITING


Time Seen by Provider: 12/20/17 00:19


Notes: 





Patient is 51-year-old female with a history of vomiting and diarrhea.  She was 

admitted just over a month ago for vomiting diarrhea and dehydration acute 

kidney injury.  Creatinine returned back to normal and she was discharged home.

  She said for last several weeks she has had no issues but then over last 3 

days she is having vomiting diarrhea again.  She says that she is making very 

small amounts of urine.  No fevers.  No blood in her stool.  No blood or 

emesis.  No associated pain.  No pain in her abdomen.  No other complaints this 

time.  During her last admission her C. difficile test was negative.  She has 

not been on any recent antibiotics.


TRAVEL OUTSIDE OF THE [place] IN LAST 30 DAYS: No





- Related Data


Allergies/Adverse Reactions: 


 





tramadol Allergy (Intermediate, Verified 12/20/17 01:09)


 AMS


Penicillins Allergy (Mild, Verified 12/20/17 01:09)


 rash


Sulfa (Sulfonamide Antibiotics) Allergy (Mild, Verified 12/20/17 01:09)


 rash


aspirin Allergy (Verified 12/20/17 01:09)


 








Home Medications: 


 Current Home Medications





Alprazolam [Xanax Xr 1 mg Tablet Extended Release] 1 tab PO TID 12/20/17 [

History]


Levothyroxine Sodium [Synthroid 50 Mcg Tablet] 1 tab PO DAILY 12/20/17 [History]











Past Medical History





- Social History


Smoking Status: Unknown if Ever Smoked


Frequency of alcohol use: None


Drug Abuse: None


Family History: CAD, CVA, DM, Hyperlipidemia, Hypertension, Malignancy, Thyroid 

Disfunction, Other


Patient has suicidal ideation: No


Patient has homicidal ideation: No





- Past Medical History


Cardiac Medical History: Reports: Hx Atrial Fibrillation, Hx 

Hypercholesterolemia


   Denies: Hx Coronary Artery Disease, Hx Heart Attack, Hx Hypertension


Pulmonary Medical History: Reports: Hx Asthma, Hx Bronchitis


   Denies: Hx COPD, Hx Pneumonia


Neurological Medical History: Denies: Hx Cerebrovascular Accident, Hx Seizures


Endocrine Medical History: Reports: Hx Diabetes Mellitus Type 2 - borderline, 

Hx Hypothyroidism


Renal/ Medical History: Denies: Hx Peritoneal Dialysis


GI Medical History: Reports: Hx Gastritis, Hx Gastroesophageal Reflux Disease


Musculoskeltal Medical History: Reports Hx Arthritis, Reports Hx 

Musculoskeletal Trauma


Psychiatric Medical History: Reports: Hx Anxiety, Hx Depression, Hx 

Schizophrenia


Past Surgical History: Reports: Hx Cholecystectomy, Hx Tubal Ligation.  Denies: 

Hx Hysterectomy





- Immunizations


Immunizations up to date: Yes


Hx Diphtheria, Pertussis, Tetanus Vaccination: Yes - 2013





Review of Systems





- Review of Systems


Notes: 





My Normal Review Basic





REVIEW OF SYSTEMS:


CONSTITUTIONAL :  Denies fever,  chills, or sweats.  Denies recent illness.


EENT:   Denies eye, ear, throat, or mouth pain or symptoms.  Denies nasal or 

sinus congestion.


CARDIOVASCULAR:  Denies chest pain.


RESPIRATORY:  Denies cough, cold, or chest congestion.  Denies shortness of 

breath, difficulty breathing, or wheezing.


GASTROINTESTINAL:  Denies abdominal pain.  Vomiting and diarrhea


GENITOURINARY: Decreased urination


MUSCULOSKELETAL:  Denies neck or back pain or joint pain or swelling.


SKIN:   Denies rash or skin lesions.


NEUROLOGICAL:  Denies altered mental status or loss of consciousness.  Denies 

headache.  Denies weakness or paralysis or loss of use of either side.  Denies 

problems with gait or speech.  Denies sensory or motor loss.


ALL OTHER SYSTEMS REVIEWED AND NEGATIVE.





Physical Exam





- Vital signs


Vitals: 


 











Temp Pulse Resp BP Pulse Ox


 


 97.7 F   108 H  16   136/78 H  108 H


 


 12/19/17 23:10  12/19/17 23:10  12/19/17 23:10  12/19/17 23:10  12/19/17 23:10














- Notes


Notes: 





General Appearance: Well nourished, alert, cooperative, no acute distress, no 

obvious discomfort.


Vitals: reviewed, See vital signs table.


Head: no swelling or tenderness to the head


Eyes: PERRL, EOMI, Conjuctiva clear


Mouth: No decreasd moisture


Throat: No tonsillar inflammation, No airway obstruction,  No lymphadenopathy


Lungs: No wheezing, No rales, No rhonci, No accessory muscle use, good air 

exchange bilaterally.


Heart: Normal rate, Regular rythm, No murmur, no rub


Abdomen: Normal BS, soft, No rigidity, No abdominal tenderness, No guarding, no 

rebound, no abdominal masses, no organomegaly


Extremities: strength 5/5 in all extremities, good pulses in all extremities, 

no swelling or tenderness in the extremities, no edema.


Skin: warm, dry, appropriate color, no rash


Neuro: speech clear, oriented x 3, normal affect, responds appropriately to 

questions.





Course





- Re-evaluation


Re-evalutation: 





12/20/17 05:12


Patient does have recurrent nausea.  I did speak with Dr. Shell, hospitalist, 

who requested CT scan.  CT scan shows diffuse colitis.  She does have acute 

renal failure.  She does need admission.  She has no leukocytosis and no 

fevers.  I do not see indication for antibiotics at this time.  I do not quite 

understand why the patient is having this happen as a second time with such 

significant increase in her creatinine.  I have ordered more IV fluids and also 

order replacement potassium and magnesium.  [facility] agrees to accept the 

patient for further workup and treatment of her vomiting diarrhea and acute 

renal failure.





Dictation of this chart was performed using voice recognition software; 

therefore, there may be some unintended grammatical errors.





- Vital Signs


Vital signs: 


 











Temp Pulse Resp BP Pulse Ox


 


 98.5 F   108 H  16   125/72   99 


 


 12/20/17 03:00  12/19/17 23:10  12/20/17 05:00  12/20/17 05:00  12/20/17 05:01














- Laboratory


Result Diagrams: 


 12/20/17 01:15





 12/20/17 02:15


Laboratory results interpreted by me: 


 











  12/20/17 12/20/17 12/20/17





  01:00 01:15 02:15


 


RBC   3.49 L 


 


Hgb   11.4 L 


 


Hct   32.3 L 


 


Potassium    3.3 L


 


Chloride    97 L


 


BUN    28 H


 


Creatinine    6.03 H


 


Est GFR ( Amer)    9 L


 


Est GFR (Non-Af Amer)    7 L


 


Glucose    111 H


 


Magnesium    1.0 L*


 


Urine Protein  30 H  


 


Urine Blood  MODERATE H  


 


Ur Leukocyte Esterase  LARGE H  














Discharge





- Discharge


Clinical Impression: 


 Hypomagnesemia, Hypokalemia, Nausea vomiting and diarrhea





Acute renal failure


Qualifiers:


 Acute renal failure type: unspecified Qualified Code(s): N17.9 - Acute kidney 

failure, unspecified





Condition: Stable


Disposition: ADMITTED AS OBSERVATION


Admitting Provider: Hospitalist


Unit Admitted: Telemetry

## 2017-12-20 NOTE — RADIOLOGY REPORT (SQ)
EXAM DESCRIPTION: 



ACUTE ABDOMEN SERIES



CLINICAL HISTORY: 



51 years, Female, recurrent vomiting



COMPARISON:

None.



FINDINGS:



Adequate lung volumes, clear parenchyma, normal cardiac

silhouette, right upper abdominal clips, paucity of bowel gas, no

obstruction, and intact bony structures.



IMPRESSION:



No acute findings.

 



 2011 [employer]- All Rights Reserved

## 2017-12-20 NOTE — RADIOLOGY REPORT (SQ)
EXAM DESCRIPTION:

CT ABD/PELVIS NO ORAL OR IV



CLINICAL HISTORY:

51 years Female, vomiting, diarrhea



COMPARISON:

6/10/2017. CR, same day.



TECHNIQUE:

No contrast. This exam was performed according to our

departmental dose-optimization program, which includes automated

exposure control, adjustment of the mA and/or kV according to

patient size and/or use of iterative reconstruction technique.



FINDINGS:



Moderate diffuse bowel wall thickening and nondistention extends

from the hepatic flexure to the proximal sigmoid consistent with

moderate colitis. No significant free fluid.



Cholecystectomy clips. Right pelvic kidney. No evidence of

appendicitis; appendix is not discerned.



Lung bases, unenhanced intra-abdominal-pelvic structures, and

musculoskeletal appear otherwise grossly intact.



IMPRESSION:

Moderate diffuse colitis pattern. Infectious, inflammatory,

neoplastic processes are in the differential diagnosis.

## 2017-12-21 NOTE — PDOC PROGRESS REPORT
Subjective


Progress Note for:: 12/21/17


Subjective:: 


Denies any complaints.


Reason For Visit: 


ARF, COLITIS








Physical Exam


Vital Signs: 


 











Temp Pulse Resp BP Pulse Ox


 


 97.8 F   72   16   100/52 L  94 


 


 12/21/17 08:00  12/21/17 08:00  12/21/17 08:00  12/21/17 08:00  12/21/17 08:00








 Intake & Output











 12/20/17 12/21/17 12/22/17





 06:59 06:59 06:59


 


Intake Total  4900 


 


Output Total  2350 


 


Balance  2550 


 


Weight 100 kg 105 kg 











General appearance: PRESENT: no acute distress


Eye exam: PRESENT: conjunctiva pink.  ABSENT: scleral icterus


Mouth exam: PRESENT: moist, tongue midline


Neck exam: ABSENT: JVD


Respiratory exam: PRESENT: clear to auscultation pallavi.  ABSENT: rales, rhonchi, 

wheezes


Cardiovascular exam: PRESENT: RRR.  ABSENT: diastolic murmur, rubs, systolic 

murmur


GI/Abdominal exam: PRESENT: normal bowel sounds, soft.  ABSENT: distended, 

guarding, mass, organolmegaly, rebound, tenderness


Extremities exam: ABSENT: calf tenderness, clubbing, pedal edema


Neurological exam: PRESENT: alert, awake, oriented to person, oriented to place

, oriented to time, oriented to situation, CN II-XII grossly intact.  ABSENT: 

motor sensory deficit


Psychiatric exam: PRESENT: appropriate affect


Skin exam: PRESENT: dry, intact, warm.  ABSENT: cyanosis, rash





Results


Laboratory Results: 


 





 12/21/17 04:53 





 12/21/17 04:58 





 











  12/21/17 12/21/17





  04:53 04:58


 


WBC  4.9 


 


RBC  3.28 L 


 


Hgb  10.3 L 


 


Hct  30.9 L 


 


MCV  94 


 


MCH  31.6 


 


MCHC  33.4 


 


RDW  14.0 


 


Plt Count  246 


 


Seg Neutrophils %  49.6 


 


Lymphocytes %  42.3 


 


Monocytes %  6.5 


 


Eosinophils %  0.4 


 


Basophils %  1.2 


 


Absolute Neutrophils  2.4 


 


Absolute Lymphocytes  2.1 


 


Absolute Monocytes  0.3 


 


Absolute Eosinophils  0.0 


 


Absolute Basophils  0.1 


 


Sodium   140.0


 


Potassium   3.6


 


Chloride   106


 


Carbon Dioxide   24


 


Anion Gap   10


 


BUN   21 H


 


Creatinine   3.68 H


 


Est GFR ( Amer)   16 L


 


Est GFR (Non-Af Amer)   13 L


 


Glucose   104


 


Calcium   8.2 L








 











  12/20/17





  05:30


 


Creatine Kinase  595 H











Impressions: 


 





Acute Abdomen Series  12/20/17 00:27


IMPRESSION:


 


No acute findings.


 


 


 2011 [employer]- All Rights Reserved


 








Abdomen/Pelvis CT  12/20/17 03:59


IMPRESSION:


Moderate diffuse colitis pattern. Infectious, inflammatory,


neoplastic processes are in the differential diagnosis.


 














Assessment & Plan





- Diagnosis


(1) Acute renal failure


Qualifiers: 


   Acute renal failure type: unspecified   Qualified Code(s): N17.9 - Acute 

kidney failure, unspecified   


Is this a current diagnosis for this admission?: Yes   


Plan: 


Creatinine continues to improve.  Most likely secondary to decreased p.o. 

intake related to her abdominal symptoms.  We will continue with IV fluids.








(2) Colitis


Is this a current diagnosis for this admission?: Yes   


Plan: 


Continue with Flagyl.  Will advance her diet today.








(3) Anxiety


Is this a current diagnosis for this admission?: Yes   


Plan: 


Continue with Elavil and Xanax.








(4) Hypertension


Qualifiers: 


   Hypertension type: essential hypertension   Qualified Code(s): I10 - 

Essential (primary) hypertension   


Is this a current diagnosis for this admission?: Yes   





(5) Hypothyroidism


Is this a current diagnosis for this admission?: Yes   


Plan: 


Patient has not been compliant with her Synthroid.  It has been restarted.








(6) Paroxysmal atrial fibrillation


Is this a current diagnosis for this admission?: Yes   


Plan: 


Patient is currently in a normal sinus rhythm.








(7) Schizoaffective disorder


Is this a current diagnosis for this admission?: Yes   


Plan: 


Continue with Xanax and Elavil.








(8) Type 2 diabetes mellitus


Qualifiers: 


   Chronic kidney disease stage: unspecified stage 


Is this a current diagnosis for this admission?: Yes   


Plan: 


Cover with sliding scale insulin.








- Time


Time Spent with patient: 25-34 minutes





- Inpatient Certification


Medical Necessity: Need Close Monitoring Due to Risk of Patient Decompensation

## 2017-12-22 NOTE — PDOC PROGRESS REPORT
Subjective


Progress Note for:: 12/22/17


Subjective:: 


Denies any complaints.  Had some urinary incontinence earlier this morning


Reason For Visit: 


ARF, COLITIS








Physical Exam


Vital Signs: 


 











Temp Pulse Resp BP Pulse Ox


 


 98.1 F   67   17   103/48 L  97 


 


 12/22/17 04:30  12/22/17 07:00  12/22/17 04:30  12/22/17 04:30  12/22/17 04:30








 Intake & Output











 12/21/17 12/22/17 12/23/17





 06:59 06:59 06:59


 


Intake Total 4900 8712 


 


Output Total 2350 2510 


 


Balance 2550 6202 


 


Weight 105 kg 105 kg 











General appearance: PRESENT: no acute distress


Eye exam: PRESENT: conjunctiva pink.  ABSENT: scleral icterus


Mouth exam: PRESENT: moist, tongue midline


Neck exam: ABSENT: JVD


Respiratory exam: PRESENT: clear to auscultation pallavi.  ABSENT: rales, rhonchi, 

wheezes


Cardiovascular exam: PRESENT: RRR.  ABSENT: diastolic murmur, rubs, systolic 

murmur


GI/Abdominal exam: PRESENT: normal bowel sounds, soft.  ABSENT: distended, 

guarding, mass, organolmegaly, rebound, tenderness


Extremities exam: ABSENT: calf tenderness, clubbing, pedal edema


Neurological exam: PRESENT: alert, awake, oriented to person, oriented to place

, oriented to time, oriented to situation, CN II-XII grossly intact.  ABSENT: 

motor sensory deficit


Psychiatric exam: PRESENT: flat affect


Skin exam: PRESENT: dry, intact, warm.  ABSENT: cyanosis, rash





Results


Laboratory Results: 


 





 12/22/17 04:48 





 12/22/17 04:48 





 











  12/22/17 12/22/17





  04:48 04:48


 


WBC  4.5 


 


RBC  2.69 L 


 


Hgb  8.6 L 


 


Hct  25.1 L 


 


MCV  94 


 


MCH  32.0 


 


MCHC  34.2 


 


RDW  13.9 


 


Plt Count  220 


 


Seg Neutrophils %  Not Reportable 


 


Lymphocytes %  Not Reportable 


 


Monocytes %  Not Reportable 


 


Eosinophils %  Not Reportable 


 


Basophils %  Not Reportable 


 


Absolute Neutrophils  Not Reportable 


 


Absolute Lymphocytes  Not Reportable 


 


Absolute Monocytes  Not Reportable 


 


Absolute Eosinophils  Not Reportable 


 


Absolute Basophils  Not Reportable 


 


Sodium   141.3


 


Potassium   3.7


 


Chloride   109 H


 


Carbon Dioxide   22


 


Anion Gap   10


 


BUN   23 H


 


Creatinine   2.35 H


 


Est GFR ( Amer)   26 L


 


Est GFR (Non-Af Amer)   22 L


 


Glucose   104


 


Calcium   8.6








 











  12/20/17





  05:30


 


Creatine Kinase  595 H











Impressions: 


 





Acute Abdomen Series  12/20/17 00:27


IMPRESSION:


 


No acute findings.


 


 


 2011 [employer]- All Rights Reserved


 








Abdomen/Pelvis CT  12/20/17 03:59


IMPRESSION:


Moderate diffuse colitis pattern. Infectious, inflammatory,


neoplastic processes are in the differential diagnosis.


 














Assessment & Plan





- Diagnosis


(1) Acute renal failure


Qualifiers: 


   Acute renal failure type: unspecified   Qualified Code(s): N17.9 - Acute 

kidney failure, unspecified   


Is this a current diagnosis for this admission?: Yes   


Plan: 


Creatinine continues to improve.  Most likely secondary to decreased p.o. 

intake related to her abdominal symptoms.  We will continue with IV fluids.








(2) Colitis


Is this a current diagnosis for this admission?: Yes   


Plan: 


Continue with Flagyl.  








(3) Anxiety


Is this a current diagnosis for this admission?: Yes   


Plan: 


Continue with Elavil and Xanax.








(4) Hypertension


Qualifiers: 


   Hypertension type: essential hypertension   Qualified Code(s): I10 - 

Essential (primary) hypertension   


Is this a current diagnosis for this admission?: Yes   





(5) Hypothyroidism


Is this a current diagnosis for this admission?: Yes   


Plan: 


Continue Synthroid








(6) Paroxysmal atrial fibrillation


Is this a current diagnosis for this admission?: Yes   


Plan: 


Patient is currently in a normal sinus rhythm.








(7) Schizoaffective disorder


Is this a current diagnosis for this admission?: Yes   


Plan: 


Continue with Xanax and Elavil.








(8) Type 2 diabetes mellitus


Qualifiers: 


   Chronic kidney disease stage: unspecified stage 


Is this a current diagnosis for this admission?: Yes   


Plan: 


Cover with sliding scale insulin.








- Time


Time Spent with patient: 15-24 minutes





- Inpatient Certification


Medical Necessity: Need For IV Fluids

## 2017-12-23 NOTE — PDOC DISCHARGE SUMMARY
General





- Admit/Disc Date/PCP


Admission Date/Primary Care Provider: 


  12/20/17 05:20





  





Discharge Date: 12/23/17





- Discharge Diagnosis


(1) Acute renal failure


Is this a current diagnosis for this admission?: Yes   


Summary: 


Most likely secondary to dehydration.








(2) Colitis


Is this a current diagnosis for this admission?: Yes   


Summary: 


Resolving with Flagyl.








(3) Anxiety


Is this a current diagnosis for this admission?: Yes   





(4) Hypertension


Is this a current diagnosis for this admission?: Yes   





(5) Hypothyroidism


Is this a current diagnosis for this admission?: Yes   





(6) Paroxysmal atrial fibrillation


Is this a current diagnosis for this admission?: Yes   





(7) Schizoaffective disorder


Is this a current diagnosis for this admission?: Yes   





(8) Type 2 diabetes mellitus


Is this a current diagnosis for this admission?: Yes   





- Additional Information


Resuscitation Status: Full Code


Discharge Diet: Cardiac


Discharge Activity: Activity As Tolerated


Prescriptions: 


Metronidazole [Flagyl 500 mg Tablet] 500 mg PO Q6 #20 tablet


Home Medications: 








Albuterol Sulfate [Ventolin Hfa] 1 puff IH Q4HP PRN 12/20/17 


Alprazolam [Xanax] 1 mg PO TID 12/20/17 


Amitriptyline HCl [Elavil 25 mg Tablet] 50 mg PO QHS 12/20/17 


Apixaban [Eliquis 5 mg Tablet] 5 mg PO Q12 12/20/17 


Desvenlafaxine Succinate [Pristiq  mg Tab.sr] 100 mg PO DAILY 12/20/17 


Fluticasone/Salmeterol [Advair 250-50 Diskus 14 Dose/Diskus] 1 puff IH Q12 12/20 /17 


Lamotrigine [Lamictal] 75 mg PO DAILY 12/20/17 


Levothyroxine Sodium [Tirosint] 137 mcg PO Q6AM 12/20/17 


Metformin HCl [Glucophage 500 mg Tablet] 1,000 mg PO BID 12/20/17 


Metoprolol Succinate [Toprol Xl 25 mg Tab.sr] 25 mg PO BID 12/20/17 


Simvastatin [Zocor 40 mg Tablet] 40 mg PO QPM 12/20/17 


Spironolactone [Aldactone 25 mg Tablet] 25 mg PO DAILY 12/20/17 


Tizanidine HCl [Zanaflex] 6 mg PO TID 12/20/17 


Zolpidem Tartrate [Ambien] 10 mg PO QHS 12/20/17 


Metronidazole [Flagyl 500 mg Tablet] 500 mg PO Q6 #20 tablet 12/23/17 











History of Present Illness


History of Present Illness: 


EVERARDO GEORGE is a 51 year old female who has a history of diabetes, 

hypertension and recent acute renal failure after gastroenteritis who presented 

with abdominal pain and nonbloody diarrhea that was similar to her previous 

presentation.  The patient had a CT scan which showed findings suggestive of 

acute colitis.  The patient also was found to have an elevated creatinine of 6.

  The patient did not have any fevers or chills.  Patient is admitted for 

treatment of acute renal failure.








Hospital Course


Hospital Course: 





51-year-old female who presented with acute renal failure felt to be secondary 

to decreased p.o. intake with dehydration.  The patient had evidence for 

colitis and was started on Flagyl.  The patient's creatinine when she presented 

with 6.  With IV fluids it was down to 1.6 on the day of discharge.  She was 

started on Flagyl for treatment of her colitis and she tolerated this well and 

had resolution of her abdominal pain.  The patient also has diabetes and had 

been on metformin.  Metformin was stopped because of her elevated creatinine.  

She may be able to go up on that again once her renal failure has completely 

resolved.  The patient was tolerating a diet without difficulty and was felt 

that she was stable for discharge home.





Physical Exam


Vital Signs: 


 











Temp Pulse Resp BP Pulse Ox


 


 97.7 F   97   16   111/65   93 


 


 12/23/17 11:09  12/23/17 11:58  12/23/17 11:09  12/23/17 11:58  12/23/17 11:09








 Intake & Output











 12/22/17 12/23/17 12/24/17





 06:59 06:59 06:59


 


Intake Total 8712 5130 


 


Output Total 2510 3000 


 


Balance 6202 2130 


 


Weight 105 kg 105 kg 











General appearance: PRESENT: no acute distress


Eye exam: PRESENT: conjunctiva pink.  ABSENT: scleral icterus


Mouth exam: PRESENT: moist, tongue midline


Neck exam: ABSENT: JVD


Respiratory exam: PRESENT: clear to auscultation pallavi.  ABSENT: rales, rhonchi, 

wheezes


Cardiovascular exam: PRESENT: RRR.  ABSENT: diastolic murmur, rubs, systolic 

murmur


GI/Abdominal exam: PRESENT: normal bowel sounds, soft.  ABSENT: distended, 

guarding, mass, organolmegaly, rebound, tenderness


Extremities exam: ABSENT: calf tenderness, clubbing, pedal edema


Neurological exam: PRESENT: alert, awake, oriented to person, oriented to place

, oriented to time, oriented to situation, CN II-XII grossly intact.  ABSENT: 

motor sensory deficit


Psychiatric exam: PRESENT: appropriate affect


Skin exam: PRESENT: dry, intact, warm.  ABSENT: cyanosis, rash





Results


Laboratory Results: 


 





 12/22/17 04:48 





 12/23/17 05:15 





 











  12/23/17





  05:15


 


Sodium  141.2


 


Potassium  3.7


 


Chloride  111 H


 


Carbon Dioxide  20 L


 


Anion Gap  10


 


BUN  17


 


Creatinine  1.61 H


 


Est GFR ( Amer)  41 L


 


Est GFR (Non-Af Amer)  34 L


 


Glucose  97


 


Calcium  8.9








 











  12/20/17





  05:30


 


Creatine Kinase  595 H











Impressions: 


 





Acute Abdomen Series  12/20/17 00:27


IMPRESSION:


 


No acute findings.


 


 


 2011 [email or web address]- All Rights Reserved


 








Abdomen/Pelvis CT  12/20/17 03:59


IMPRESSION:


Moderate diffuse colitis pattern. Infectious, inflammatory,


neoplastic processes are in the differential diagnosis.


 














Qualifiers


**PATEINT BEING DISCHARGED WITH ANY OF THE FOLLOWING DIAGNOSIS?: No





Plan


Discharge Plan: 





Patient is discharged to home.  Follow-up with primary care in 2 weeks.  She is 

instructed to hold metformin until she has follow-up blood work shows 

resolution of her renal failure.


Time Spent: Greater than 30 Minutes

## 2018-01-30 NOTE — XCELERA REPORT
[address] [address]

                             Tel: [phone]

                             Fax: [phone]



                    Transthoracic Echocardiogram Report

____________________________________________________________________________



Name: EVERARDO GEORGE

MRN: [id number]                Age: 51 yrs

Gender: Female                 : 1966

Patient Status: Outpatient     Patient Location: 

Account #: [id number]

Study Date: 2018 11:05 AM

Accession #: [id number]

____________________________________________________________________________



Height: 63 in        Weight: 222 lb        BSA: 2.0 m2



____________________________________________________________________________



Reason For Study: AFIB





Ordering Physician: DAVID BASHIR

Performed By: Jerrica Starr

____________________________________________________________________________





Interpretation Summary

Min post. pericardial effusion

AV sclerosis mild, no AS, no AR.

Mild Mild annular calcification, no MS, no MVP, no MR, but mild/mod LA

enlargement, MOUSTAPHA not measured.

LV grossly normal LVEF, may be mild Inferoseptal hypokinesis, stage I LV

diastolic dysfunction.

Mild TR with RVSP 24 and normal. No R heart enlargement. No ASD



____________________________________________________________________________



MMode/2D Measurements & Calculations

RVDd: 2.8 cm       LVIDd: 5.1 cm FS: 38.0 %          Ao root diam: 2.6 cm

IVSd: 0.93 cm      LVIDs: 3.1 cm EDV(Teich): 122.2 ml

                   LVPWd: 0.94 cmESV(Teich): 39.3 ml Ao root area: 5.2 cm2

                                 EF(Teich): 67.9 %   LA dimension: 4.0 cm



        _____________________________________________________________

LVOT diam: 2.3 cm

LVOT area: 4.0 cm2





Doppler Measurements & Calculations

MV E max emir:     MV P1/2t max emir:    Ao V2 max:        LV V1 max P.4 cm/sec       87.9 cm/sec          97.7 cm/sec       1.9 mmHg

MV A max emir:     MV P1/2t: 51.1 msec  Ao max PG:        LV V1 max:

72.6 cm/sec       MVA(P1/2t): 4.3 cm2  3.8 mmHg          68.6 cm/sec

MV E/A: 1.2       MV dec slope:        SVETLANA(V,D): 2.8 cm2

                  503.5 cm/sec2



        _____________________________________________________________

TV V2 max:        PA V2 max:           PI end-d emir:     TR max emir:

207.7 cm/sec      78.0 cm/sec          93.7 cm/sec       228.0 cm/sec

TV max PG:        PA max P.4 mmHg                    TR max P.3 mmHg                                                20.8 mmHg



____________________________________________________________________________

Left Ventricle

The left ventricle is normal in size. There is normal left ventricular wall

thickness. LV EF is 65%. Doppler measurements suggest normal left

ventricular diastolic function. There is inferoseptal wall moderate

hypokinesis. There is no thrombus.



Right Ventricle

The right ventricle is grossly normal size. The right ventricular systolic

function is normal.



Atria

The right atrium is normal. The left atrium is moderately dilated. The

interatrial septum is intact with no evidence for an atrial septal defect.





Mitral Valve

The mitral valve is grossly normal. There is no evidence of mitral valve

prolapse. There is no mitral valve stenosis. There is no mitral

regurgitation noted.



Aortic Valve

The aortic valve is sclerotic and shows some degree of functional

abnormality. The aortic valve is trileaflet. The aortic valve opens well.

There is no aortic valvular vegetation. There is no aortic valve stenosis.

No aortic regurgitation is present.



Tricuspid Valve

There is a mild amount of tricuspid regurgitation. Best estimated RVSP is

approximately 24 mm/Hg.



Pulmonic Valve

There is a trace or physiologic amount of pulmonic regurgitation.



Great Vessels

The aortic root is normal size.





Effusions

Minimal pericardial effusion.









I      WMSI = 1.13     % Normal = 88

































                                                                Segments  Size

X - Cannot   1 - Normal   2 -          3 - Akinetic4 -          1-2     small

Interpret                 Hypokinetic              Dyskinetic   3-5     moderate

5 -                                                             6-14    large

Aneurysmal                                                      15-16   diffuse





____________________________________________________________________________



Electronically signed by:      Tawanda Ramirez      on 2018 08:42 PM



CC: DAVID BASHIR

>

Tawanda Ramirez

## 2018-05-24 NOTE — ER DOCUMENT REPORT
ED GI/





- General


Chief Complaint: Urinary Problem


Stated Complaint: URINARY PROBLEM


Time Seen by Provider: 05/23/18 23:58


Notes: 





Patient is a 52-year-old female that comes emergency department for chief 

complaint of possible problem with her kidneys and bladder.  She states that 

she always gets no warning and when she has to urinate she urinates very 

quickly and at times has not even made it across the house to get into the 

bathroom and has urinated on herself.  She states she has had worsening 

problems with this at least over the past couple of weeks.  She denies fever 

chills, nausea or vomiting, dysuria.  She states she does have some pain in her 

lower back, she denies injury.





TRAVEL OUTSIDE OF THE [place] IN LAST 30 DAYS: No





- Related Data


Allergies/Adverse Reactions: 


 





tramadol Allergy (Intermediate, Verified 12/20/17 01:09)


 AMS


Penicillins Allergy (Mild, Verified 12/20/17 01:09)


 rash


Sulfa (Sulfonamide Antibiotics) Allergy (Mild, Verified 12/20/17 01:09)


 rash


aspirin Allergy (Verified 12/20/17 01:09)


 











Past Medical History





- General


Information source: Patient





- Social History


Smoking Status: Former Smoker


Chew tobacco use (# tins/day): No


Frequency of alcohol use: Occasional


Drug Abuse: None


Family History: CAD, CVA, DM, Hyperlipidemia, Hypertension, Malignancy, Thyroid 

Disfunction, Other


Patient has suicidal ideation: No


Patient has homicidal ideation: No





- Past Medical History


Cardiac Medical History: Reports: Hx Atrial Fibrillation, Hx 

Hypercholesterolemia


   Denies: Hx Coronary Artery Disease, Hx Heart Attack, Hx Hypertension


Pulmonary Medical History: Reports: Hx Asthma, Hx Bronchitis


   Denies: Hx COPD, Hx Pneumonia


Neurological Medical History: Denies: Hx Cerebrovascular Accident, Hx Seizures


Endocrine Medical History: Reports: Hx Diabetes Mellitus Type 2 - borderline, 

Hx Hypothyroidism


Renal/ Medical History: Denies: Hx Peritoneal Dialysis


GI Medical History: Reports: Hx Gastritis, Hx Gastroesophageal Reflux Disease


Musculoskeltal Medical History: Reports Hx Arthritis, Reports Hx 

Musculoskeletal Trauma


Psychiatric Medical History: Reports: Hx Anxiety, Hx Depression, Hx 

Schizoaffective Disorder, Hx Schizophrenia


Past Surgical History: Reports: Hx Cholecystectomy, Hx Tubal Ligation.  Denies: 

Hx Hysterectomy





- Immunizations


Immunizations up to date: Yes


Hx Diphtheria, Pertussis, Tetanus Vaccination: Yes - 2013





Review of Systems





- Review of Systems


Constitutional: No symptoms reported


EENT: No symptoms reported


Cardiovascular: No symptoms reported


Respiratory: No symptoms reported


Gastrointestinal: No symptoms reported


Genitourinary: See HPI


Female Genitourinary: No symptoms reported


Musculoskeletal: No symptoms reported


Skin: No symptoms reported


Hematologic/Lymphatic: No symptoms reported


Neurological/Psychological: No symptoms reported





Physical Exam





- Vital signs


Vitals: 


 











Temp Pulse Resp BP Pulse Ox


 


 98.1 F   89   22 H  138/80 H  97 


 


 05/23/18 21:01  05/23/18 21:01  05/23/18 21:01  05/23/18 21:01  05/23/18 21:01











Interpretation: Normal





- General


General appearance: Appears well, Alert





- HEENT


Head: Normocephalic, Atraumatic


Eyes: Normal


Pupils: PERRL





- Respiratory


Respiratory status: No respiratory distress


Chest status: Nontender


Breath sounds: Normal


Chest palpation: Normal





- Cardiovascular


Rhythm: Regular


Heart sounds: Normal auscultation


Murmur: No





- Abdominal


Inspection: Normal


Distension: No distension


Bowel sounds: Normal


Tenderness: Nontender.  No: Tender, Guarding


Organomegaly: No organomegaly





- Back


Back: Normal, Nontender





- Extremities


General upper extremity: Normal inspection, Nontender, Normal color, Normal ROM

, Normal temperature


General lower extremity: Normal inspection, Nontender, Normal color, Normal ROM

, Normal temperature, Normal weight bearing.  No: Eliane's sign





- Neurological


Neuro grossly intact: Yes


Cognition: Normal


Orientation: AAOx4


[place] Coma Scale Eye Opening: Spontaneous


[place] Coma Scale Verbal: Oriented


[place] Coma Scale Motor: Obeys Commands


[place] Coma Scale Total: 15


Speech: Normal


Motor strength normal: LUE, RUE, LLE, RLE


Sensory: Normal





- Psychological


Associated symptoms: Normal affect, Normal mood





- Skin


Skin Temperature: Warm


Skin Moisture: Dry


Skin Color: Normal





Course





- Re-evaluation


Re-evalutation: 


Patient with no CVA tenderness, soft abdomen, CBC unremarkable, chemistry 

unremarkable including renal functioning, urinalysis unremarkable.  Patient 

with description of symptoms consistent with urge incontinence.  Patient is on 

multiple psychiatric medications including Genaro, Trental X, Latuda, multiple 

ones of these are new.  Concerned that she might have symptoms secondary to this

, she has also had multiple children.  No concerning symptoms reported 

otherwise.  Discussed workup with patient.  After discussion patient requests 

both Vistaril and oxybutynin which had been discussed, I discussed medication 

adjustments with her provider, Hitesh medina, and she was provided with 

handout description of recommendations and instructions regarding urge 

incontinence.  Discussed with patient and family in detail, discussed follow-up 

and return precautions, they state understanding and agreement.





- Vital Signs


Vital signs: 


 











Temp Pulse Resp BP Pulse Ox


 


 97.4 F   89   22 H  124/67   98 


 


 05/24/18 01:32  05/24/18 01:32  05/23/18 21:01  05/24/18 01:32  05/24/18 01:32














- Laboratory


Result Diagrams: 


 05/23/18 22:25





 05/23/18 22:25


Laboratory results interpreted by me: 


 











  05/23/18 05/23/18 05/23/18





  22:25 22:25 22:25


 


Hgb  11.9 L  


 


RDW  14.2 H  


 


Est GFR (Non-Af Amer)   50 L 


 


Glucose   185 H 


 


Urine Glucose (UA)    50 H














Discharge





- Discharge


Clinical Impression: 


 Urge incontinence





Condition: Stable


Disposition: HOME, SELF-CARE


Additional Instructions: 


Your workup does not show any concerning a normality's.  Your symptoms are 

consistent with urge incontinence.  Because of your worsening symptoms and 

recent medication change, please talk to your provider for possible adjustments 

of your medications for improvement.  You have been prescribed Vistaril and 

oxybutynin, both of these can help reduce your urinary symptoms. 





Return for any concerning symptoms including fever, vomiting, abdominal pain, 

or any other concerning or worsening symptoms.


Prescriptions: 


Hydroxyzine Pamoate [Vistaril 25 mg Capsule] 1 - 2 cap PO Q6 PRN #30 capsule


 PRN Reason: 


Oxybutynin Chloride 5 mg PO ASDIR PRN #30 tablet


 PRN Reason: 


Referrals: 


DEEP BALDERAS MD [Primary Care Provider] - Follow up as needed

## 2018-09-18 NOTE — RADIOLOGY REPORT (SQ)
EXAM DESCRIPTION: X-ray single view chest



CLINICAL HISTORY: 52 years Female, chest pain



COMPARISON: Prior chest x-ray performed on 2/11/2016.



TECHNIQUE: Single portable view of the chest performed on

9/18/2018 at 5:07 AM



FINDINGS: 

The lungs are well expanded and are clear. There is no evidence

of a pneumothorax.



The cardiac silhouette is normal in size and configuration.



The mediastinal contours are normal.



No acute osseous abnormality is identified.



No focal soft tissue abnormalities are seen.



There are multiple overlying cardiac monitor leads.



IMPRESSION:

No evidence of acute intrathoracic disease.

## 2018-09-18 NOTE — ER DOCUMENT REPORT
ED Cardiac





- General


Chief Complaint: Chest Pain


Stated Complaint: CHEST PAIN


Time Seen by Provider: 09/18/18 03:12


Notes: 





Patient is a 52-year-old female comes emergency department for chief complaint 

of chest pain, she states the pain is a sharp pain across her chest on both 

sides that goes down into her ribs.  Pain is reproducible by movement.  No cough

, injury, fever, nausea, vomiting reported.  She comes by EMS, declined aspirin 

in route, again declines it here, states that she takes Eliquis and she refuses 

to mix them.  She is on Eliquis for atrial fibrillation, history of history of 

hyperlipidemia, anxiety, depression, bipolar.  She denies smoking, alcohol, 

recreational drugs.


TRAVEL OUTSIDE OF THE [place] IN LAST 30 DAYS: No





- Related Data


Allergies/Adverse Reactions: 


 





tramadol Allergy (Intermediate, Verified 12/20/17 01:09)


 AMS


Penicillins Allergy (Mild, Verified 12/20/17 01:09)


 rash


Sulfa (Sulfonamide Antibiotics) Allergy (Mild, Verified 12/20/17 01:09)


 rash


aspirin Allergy (Verified 12/20/17 01:09)


 











Past Medical History





- General


Information source: Patient





- Social History


Smoking Status: Never Smoker


Frequency of alcohol use: None


Drug Abuse: None


Lives with: Family


Family History: CAD, CVA, DM, Hyperlipidemia, Hypertension, Malignancy, Thyroid 

Disfunction, Other


Patient has suicidal ideation: No


Patient has homicidal ideation: No





- Past Medical History


Cardiac Medical History: Reports: Hx Atrial Fibrillation, Hx 

Hypercholesterolemia


   Denies: Hx Coronary Artery Disease, Hx Heart Attack, Hx Hypertension


Pulmonary Medical History: Reports: Hx Asthma, Hx Bronchitis


   Denies: Hx COPD, Hx Pneumonia


Neurological Medical History: Denies: Hx Cerebrovascular Accident, Hx Seizures


Endocrine Medical History: Reports: Hx Diabetes Mellitus Type 2 - borderline, 

Hx Hypothyroidism


Renal/ Medical History: Denies: Hx Peritoneal Dialysis


GI Medical History: Reports: Hx Gastritis, Hx Gastroesophageal Reflux Disease


Musculoskeletal Medical History: Reports Hx Arthritis, Reports Hx 

Musculoskeletal Trauma


Psychiatric Medical History: Reports: Hx Anxiety, Hx Depression, Hx 

Schizoaffective Disorder, Hx Schizophrenia


Past Surgical History: Reports: Hx Cholecystectomy, Hx Tubal Ligation.  Denies: 

Hx Hysterectomy





- Immunizations


Immunizations up to date: Yes


Hx Diphtheria, Pertussis, Tetanus Vaccination: Yes - 2013





Review of Systems





- Review of Systems


Constitutional: No symptoms reported


EENT: No symptoms reported


Cardiovascular: See HPI


Respiratory: No symptoms reported


Gastrointestinal: No symptoms reported


Genitourinary: No symptoms reported


Female Genitourinary: No symptoms reported


Musculoskeletal: See HPI


Skin: No symptoms reported


Hematologic/Lymphatic: No symptoms reported


Neurological/Psychological: No symptoms reported





Physical Exam





- Vital signs


Vitals: 


 











Temp Pulse Resp BP Pulse Ox


 


 98.0 F   94   18   108/54 L  96 


 


 09/18/18 00:42  09/18/18 00:42  09/18/18 00:42  09/18/18 00:42  09/18/18 00:42














- Notes


Notes: 





GENERAL: Alert, interacts well. No acute distress.


HEAD: Normocephalic, atraumatic.


EYES: Pupils equal, round, and reactive to light. Extraocular movements intact.


ENT: Oral mucosa moist, tongue midline. 


NECK: Full range of motion. Supple. Trachea midline.


LUNGS: Clear to auscultation bilaterally, no wheezes, rales, or rhonchi. No 

respiratory distress.  Specific and reproducible bilateral parasternal chest 

pain specifically in between the ribs.  Pain with movement.


HEART: Regular rate and rhythm. No murmur


ABDOMEN: Soft, non-tender. Non-distended. Bowel sounds present in all 4 

quadrants.


EXTREMITIES: Moves all 4 extremities spontaneously. No edema, normal radial and 

dorsalis pedis pulses bilaterally. No cyanosis.


BACK: no cervical, thoracic, lumbar midline tenderness. No saddle anesthesia, 

normal distal neurovascular exam. 


NEUROLOGICAL: Alert and oriented x3. Normal speech. [cranial nerves II through 

XII grossly intact]. 


PSYCH: Normal affect, normal mood.


SKIN: Warm, dry, normal turgor. No rashes or lesions noted.








Course





- Re-evaluation


Re-evalutation: 


EKG shows sinus rhythm at a rate of 94, QTc 461, .  No T-wave inversions 

or ST segment changes some artifact noted.  Normal axis.





Chest x-ray unremarkable.  CBC, chemistry unremarkable.  Initial troponin is 

negative.  We will cycle.





Patient continues to have very specific reproducible chest wall pain on 

evaluation.  Second troponin cycled and negative.  I discussed with patient.  

Patient states she actually gets "this pain a lot", states she has discussed 

this with her primary provider.  She has had a negative stress test in the 

past.  She is not tachycardic, has no shortness of breath, no lower extremity 

swelling, no previous blood clot, and she is on Eliquis.  Low suspicion of ACS, 

pulmonary embolism, aortic dissection.  Appears to be chest wall pain 

specifically.  Discussed with patient.  Provided her with dexamethasone for 

treatment of this, discussed follow-up with primary care, discussed return 

precautions.  Patient states satisfaction and agreement with plan.





- Vital Signs


Vital signs: 


 











Temp Pulse Resp BP Pulse Ox


 


 98.0 F   94   20   107/59 L  100 


 


 09/18/18 00:42  09/18/18 00:42  09/18/18 05:01  09/18/18 05:01  09/18/18 05:01














- Laboratory


Result Diagrams: 


 09/18/18 01:15





 09/18/18 02:10


Laboratory results interpreted by me: 


 











  09/18/18 09/18/18





  01:15 02:10


 


Hgb  11.9 L 


 


Hct  35.9 L 


 


Est GFR (Non-Af Amer)   53 L


 


Glucose   173 H


 


Direct Bilirubin   0.6 H


 


Creatine Kinase   190 H














Discharge





- Discharge


Clinical Impression: 


 Chest wall pain





Chest pain


Qualifiers:


 Chest pain type: unspecified Qualified Code(s): R07.9 - Chest pain, unspecified





Condition: Stable


Disposition: HOME, SELF-CARE


Additional Instructions: 


You have been medicated tonight for chest wall pain.  Apply heat to the area, 

rest, follow-up with your primary care for additional management.


Return for any concerning symptoms including difficulty breathing, severe pain, 

fever of 100.4 or greater, vomiting, passing out, or any other concerning 

symptoms.


Referrals: 


DEEP BALDERAS MD [Primary Care Provider] - Follow up as needed

## 2018-09-18 NOTE — EKG REPORT
SEVERITY:- BORDERLINE ECG -

SINUS RHYTHM

ABERRANT COMPLEX, POSSIBLY SUPRAVENTRICULAR

BORDERLINE T ABNORMALITIES, DIFFUSE LEADS

:

Confirmed by: Clarissa Rowell MD 18-Sep-2018 15:30:48

## 2018-10-16 NOTE — ER DOCUMENT REPORT
HPI





- HPI


Pain Level: 5


Notes: 





Patient is a 52-year-old female who presents to the ED complaining of acute on 

chronic left shoulder pain over the last week.  Patient states that she has 

chronic intermittent pain to her left shoulders from a previous injury.  She 

has not been seen by a specialist.  Patient is not sure if she reaggravated it 

with an overhead activity or not, but is having more pain with overhead 

activities.  Pain does not radiate.  She is eating and drinking without 

difficulties.  No other concerns or complaints.  Denies any headache, fever, 

neck pain, URI, sore throat, chest pain, palpitations, syncope, cough, 

shortness of breath, wheeze, dyspnea, abdominal pain, nausea/vomiting/diarrhea, 

urinary retention, dysuria, hematuria, numbness/tingling, muscle paralysis/

weakness, or rash.





- ROS


Systems Reviewed and Negative: Yes All other systems reviewed and negative





- REPRODUCTIVE


Reproductive: DENIES: Pregnant:





Past Medical History





- Social History


Smoking Status: Unknown if Ever Smoked


Family History: CAD, CVA, DM, Hyperlipidemia, Hypertension, Malignancy, Thyroid 

Disfunction, Other





- Past Medical History


Cardiac Medical History: Reports: Hx Atrial Fibrillation, Hx 

Hypercholesterolemia


   Denies: Hx Coronary Artery Disease, Hx Heart Attack, Hx Hypertension


Pulmonary Medical History: Reports: Hx Asthma, Hx Bronchitis


   Denies: Hx COPD, Hx Pneumonia


Neurological Medical History: Denies: Hx Cerebrovascular Accident, Hx Seizures


Endocrine Medical History: Reports: Hx Diabetes Mellitus Type 2 - borderline, 

Hx Hypothyroidism


Renal/ Medical History: Denies: Hx Peritoneal Dialysis


GI Medical History: Reports: Hx Gastritis, Hx Gastroesophageal Reflux Disease


Musculoskeletal Medical History: Reports Hx Arthritis, Reports Hx 

Musculoskeletal Trauma


Psychiatric Medical History: Reports: Hx Anxiety, Hx Depression, Hx 

Schizoaffective Disorder, Hx Schizophrenia


Past Surgical History: Reports: Hx Cholecystectomy, Hx Tubal Ligation.  Denies: 

Hx Hysterectomy





- Immunizations


Immunizations up to date: Yes


Hx Diphtheria, Pertussis, Tetanus Vaccination: Yes - 2013





[facility] Provider Document





- CONSTITUTIONAL


Agree With Documented VS: Yes


Notes: 





PHYSICAL EXAMINATION:





GENERAL: Well-appearing, well-nourished and in no acute distress.





NECK: Normal range of motion, supple without lymphadenopathy.  Non-tender.  

Spurling negative. No rigidity/meningismus.





LUNGS: Breath sounds clear to auscultation bilaterally and equal.  No wheezes 

rales or rhonchi.





HEART: Regular rate and rhythm without murmurs, rubs, gallops.





Musculoskeletal: Lt shoulder:  FROM to passive.  LROM to active due to pain.  

Strength 4+/5 due to pain.  + impingement test.  Neg speed test.  No crepitus.  

No erythema or warmth.  No deformity or ecchymosis.   RC intact 5+/5 strength.





Extremities:  No cyanosis, clubbing, or edema b/l.  Peripheral pulses 2+.  

Capillary refill less than 3 seconds.





NEUROLOGICAL: Normal speech, normal gait.  Normal sensory, motor exams 





PSYCH: Normal mood, normal affect.





SKIN: Warm, Dry, normal turgor, no rashes or lesions noted.





- INFECTION CONTROL


TRAVEL OUTSIDE OF THE [place] IN LAST 30 DAYS: No





Course





- Re-evaluation


Re-evalutation: 





10/16/18 16:30


Patient is an afebrile, well-hydrated, 52-year-old female who presents to the 

ED with left shoulder pain which I suspect to be impingement/inflammatory.  

Vitals are acceptable without any significant tachycardia, tachypnea, or 

hypoxia.  PE is otherwise unremarkable for any neurovascular compromise, 

obvious tendon/ligament rupture, obvious fracture/dislocation, septic joint.  

Patient declined any Tylenol or ice.  Decadron given IM.  Patient is nontoxic-

appearing.  No other labs or imaging warranted at this time based on H&P.  

Conservative measures otherwise for symptoms.  Recheck with your PCM in 3-5 

days.  Consider consult orthopedics.  Return to the ED with any worsening/

concerning symptoms otherwise as reviewed in discharge.  Patient is in 

agreement.





- Vital Signs


Vital signs: 


 











Temp Pulse Resp BP Pulse Ox


 


 98.6 F   94   20   138/73 H  97 


 


 10/16/18 15:57  10/16/18 15:57  10/16/18 15:57  10/16/18 15:57  10/16/18 15:57














Discharge





- Discharge


Clinical Impression: 


Left shoulder pain


Qualifiers:


 Chronicity: acute Qualified Code(s): M25.512 - Pain in left shoulder





Condition: Stable


Disposition: HOME, SELF-CARE


Instructions:  Exercise Program for the Shoulder ([facility])


Additional Instructions: 


Rest, Ice, Compression, Elevation


Tylenol as needed


Light stretches daily


Strength exercises as able


Moist heat and massage may help


F/u with your PCP in 3-5 days for a recheck


Consider consult(s) with Orthopedics/physical therapy for ongoing/worsening 

symptoms





Return to the ED with any worsening symptoms and/or development of fever, 

headache, chest pain, palpitations, syncope, shortness of breath, trouble 

breathing, abdominal pain, n/v/d, muscle weakness/paralysis, numbness/tingling, 

swelling, redness, or other worsening symptoms that are concerning to you.


Forms:  Elevated Blood Pressure


Referrals: 


DEEP BALDERAS MD [Primary Care Provider] - Follow up as needed


[facility] [Provider Group] - Follow up in 1 week

## 2018-11-19 NOTE — OPERATIVE REPORT
Operative Report


DATE OF SURGERY: 11/19/18


Operative Report: 





The risks, benefits and alternatives of the procedure including the risks of 

bleeding, perforation requiring surgery are explained to the patient in detail 

and informed consent is obtained.  The patient is brought back to the endoscopy 

suite and placed in the left, lateral decubital position.  Timeout was called.  

Propofol medication is administered.  A rectal examination is done which did 

not reveal any masses, tears or fissures.  An Olympus videoscope was introduced 

into the patient's rectum.  The scope was then carefully advanced all the way 

to the cecum.  Deep intubation of the cecum could not be done.  Prep is 

relatively good.  The scope was then sequentially pulled back via the various 

segments of the colon including the ascending colon, hepatic flexure, 

transverse colon, splenic flexure, descending colon and finally into the 

rectosigmoid portions of the colon.  Retroflexion maneuver is performed.


The risks benefits and alternatives of the procedure explained to the patient 

in detail and informed consent is obtained.A GIF Olympus video scope was 

inserted into the patient's mouth and hypopharynx, the esophagus is identified 

intubated and insufflated ,the scope was then advanced through the esophagus 

stomach and duodenum, retroflexion maneuver is done the esophagus stomach and 

first and second portions of the duodenum examined


PREOPERATIVE DIAGNOSIS: Rectal bleeding.  Epigastric pain


POSTOPERATIVE DIAGNOSIS: Right side colon inflammation status post biopsy.  

Gastritis status post biopsy rule out Helicobacter pylori.  Internal hemorrhoids


OPERATION: Colonoscopy with biopsy.  EGD with biopsy


SURGEON: KATALINA RAMIREZ


ANESTHESIA: LMAC


TISSUE REMOVED OR ALTERED: As noted above.


COMPLICATIONS: 





None.


ESTIMATED BLOOD LOSS: None.


INTRAOPERATIVE FINDINGS: As noted above.


PROCEDURE: 





Patient tolerated the procedure well.


No immediate postprocedure complications are noted.


Patient discharged in good condition.


Discharge date 11/19/2018.


Discharge diet: Regular.


Discharge activity: Regular.


2-3-week follow-up to discuss findings.


Patient is instructed to call the office or proceed to the emergency room 

should there be any further problems or questions.


Wait on the pathology.

## 2018-11-19 NOTE — EKG REPORT
SEVERITY:- ABNORMAL ECG -

ATRIAL FIBRILLATION WITH RAPID V-RATE

REPOLARIZATION ABNORMALITY, PROB RATE RELATED

:

Confirmed by: Clarissa Rowell MD 19-Nov-2018 21:58:34

## 2018-11-21 NOTE — EKG REPORT
SEVERITY:- BORDERLINE ECG -

SINUS RHYTHM

BORDERLINE T WAVE ABNORMALITIES

:

Confirmed by: Clarissa Rowell MD 21-Nov-2018 15:47:25

## 2018-11-21 NOTE — RADIOLOGY REPORT (SQ)
EXAM DESCRIPTION:  CHEST SINGLE VIEW



COMPLETED DATE/TIME:  11/21/2018 4:06 pm



REASON FOR STUDY:  chest pain



COMPARISON:  2/11/2016



EXAM PARAMETERS:  NUMBER OF VIEWS: One view.

TECHNIQUE: Single frontal radiographic view of the chest acquired.

RADIATION DOSE: NA

LIMITATIONS: None.



FINDINGS:  LUNGS AND PLEURA: No opacities, masses or pneumothorax. No pleural effusion.

MEDIASTINUM AND HILAR STRUCTURES: No masses.  Contour normal.

HEART AND VASCULAR STRUCTURES: Heart normal in size.  Normal vasculature.

BONES: No acute findings.

HARDWARE: None in the chest.

OTHER: No other significant finding.



IMPRESSION:  NO ACUTE RADIOGRAPHIC FINDING IN THE CHEST.



TECHNICAL DOCUMENTATION:  JOB ID:  [id number]

 2011 [employer]- All Rights Reserved



Reading location - IP/workstation name: [email or web address]

## 2018-11-21 NOTE — RADIOLOGY REPORT (SQ)
EXAM DESCRIPTION:  CTA CHEST



COMPLETED DATE/TIME:  11/21/2018 8:04 pm



REASON FOR STUDY:  Rule out PE PERC score of 4 and Wells of 7.5



COMPARISON:  Chest x-ray 11/21/2018



TECHNIQUE:  CT scan of the chest performed using helical scanning technique with dynamic intravenous 
contrast injection.  Images reviewed with lung, soft tissue and bone windows.  Reconstructed coronal 
and sagittal MPR images reviewed.

Additional 3 dimensional post-processing performed to develop Maximal Intensity Projection images (MI
P).  All images stored on PACS.

All CT scanners at this facility use dose modulation, iterative reconstruction, and/or weight based d
osing when appropriate to reduce radiation dose to as low as reasonably achievable (ALARA).

[facility]: Dose Right  [facility]: CareDose    [facility]: Dose Right    [facility]: Teradose 4D    [facility]: Smart Technologies



CONTRAST TYPE AND DOSE:  contrast/concentration: Isovue 350.00 mg/ml; Total Contrast Delivered: 85.0 
ml; Total Saline Delivered: 110.0 ml

Contrast bolus optimized for the pulmonary arteries. Not diagnostic for the aorta.



RENAL FUNCTION:  BUN 12 creatinine 1



RADIATION DOSE:  CT Rad equipment meets quality standard of care and radiation dose reduction techniq
ues were employed. CTDIvol: 38.0 - 56.2 mGy. DLP: 1341 mGy-cm. .



LIMITATIONS:  None.



FINDINGS:  LUNGS AND PLEURA: There is limited opacification in the lingula and in the right middle lo
be.  Likely atelectatic.  Ground-glass infiltrates present in both lungs.

AORTA AND GREAT VESSELS: No aneurysm.  Contrast bolus not optimized for the aorta.

HEART: No pericardial effusion. No significant coronary artery calcifications.

PULMONARY ARTERIES: No emboli visualized in the main pulmonary arteries or the segmental branches.

HILAR AND MEDIASTINAL STRUCTURES: No identified masses or abnormal nodes.

HARDWARE: None in the chest.

UPPER ABDOMEN: No significant findings.  Limited exam.

THYROID AND OTHER SOFT TISSUES: No masses.  No adenopathy.

BONES: No acute or significant finding.

3D MIPS: Confirm above findings.

OTHER: No other significant finding.



IMPRESSION:  1.  There is no evidence of pulmonary embolus.

2.  Ground-glass infiltrates in both lungs may relate to chronic interstitial changes or mild interst
itial edema.  Seems less likely to relate to atypical infectious process.

3.  There is limited opacification in the right middle lobe and in the lingula likely related to atel
ectasis.  Cannot entirely lateral multicentric pneumonia.



COMMENT:  Quality ID # 436: Final reports with documentation of one or more dose reduction techniques
 (e.g., Automated exposure control, adjustment of the mA and/or kV according to patient size, use of 
iterative reconstruction technique)



TECHNICAL DOCUMENTATION:  JOB ID:  [id number]

 2011 [employer]- All Rights Reserved



Reading location - IP/workstation name: [email or web address]
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
## 2018-12-10 NOTE — RADIOLOGY REPORT (SQ)
EXAM DESCRIPTION:  CT HEAD WITHOUT



COMPLETED DATE/TIME:  12/10/2018 11:34 am



REASON FOR STUDY:  frontal pain



COMPARISON:  None.



TECHNIQUE:  Axial images acquired through the brain without intravenous contrast.  Images reviewed wi
th bone, brain and subdural windows.  Additional sagittal and coronal reconstructions were generated.
 Images stored on PACS.

All CT scanners at this facility use dose modulation, iterative reconstruction, and/or weight based d
osing when appropriate to reduce radiation dose to as low as reasonably achievable (ALARA).

[facility]: Dose Right  [facility]: CareDose    [facility]: Dose Right    [facility]: Teradose 4D    [facility]: Smart Technologies



RADIATION DOSE:  CT Rad equipment meets quality standard of care and radiation dose reduction techniq
ues were employed. CTDIvol: 53.2 mGy. DLP: 937 mGy-cm. mGy.



LIMITATIONS:  None.



FINDINGS:  VENTRICLES: Normal size and contour.

CEREBRUM: No masses.  No hemorrhage.  No midline shift.  No evidence for acute infarction. Normal gra
y/white matter differentiation. No areas of low density in the white matter.

CEREBELLUM: No masses.  No hemorrhage.  No alteration of density.  No evidence for acute infarction.

EXTRAAXIAL SPACES: No fluid collections.  No masses.

ORBITS AND GLOBE: No intra- or extraconal masses.  Normal contour of globe without masses.

CALVARIUM: No fracture.

PARANASAL SINUSES: There is fluid opacification of the unicameral sphenoid sinus.

SOFT TISSUES: No mass or hematoma.

OTHER: No other significant finding.



IMPRESSION:  1. No acute intracranial pathology.

2.  Sphenoid sinus disease.

EVIDENCE OF ACUTE STROKE: NO.



COMMENT:  Quality ID # 436: Final reports with documentation of one or more dose reduction techniques
 (e.g., Automated exposure control, adjustment of the mA and/or kV according to patient size, use of 
iterative reconstruction technique)



TECHNICAL DOCUMENTATION:  JOB ID:  [id number]

 2011 [email or web address]- All Rights Reserved



Reading location - IP/workstation name: [email or web address]

## 2018-12-10 NOTE — ER DOCUMENT REPORT
ED General





- General


Chief Complaint: Headache


Stated Complaint: HEADACHE


Time Seen by Provider: 12/10/18 11:03


TRAVEL OUTSIDE OF THE [place] IN LAST 30 DAYS: No





- HPI


Patient complains to provider of: Headache


Notes: 





Patient coming in for complaint of frontal headache with photophobia ongoing 

for approximately 1 month states she saw her PCP was started on a headache 

medication however no relief therefore came to the ER today is that her PCP was 

unavailable.  Patient denies any fever chills nausea vomiting diarrhea denies 

any trauma.  Patient otherwise is wearing sunglasses upon my evaluation looks 

to be no obvious distress.





- Related Data


Allergies/Adverse Reactions: 


 





tramadol Allergy (Intermediate, Verified 12/10/18 10:39)


 AMS


Penicillins Allergy (Mild, Verified 12/10/18 10:39)


 rash


Sulfa (Sulfonamide Antibiotics) Allergy (Mild, Verified 12/10/18 10:39)


 rash


aspirin Allergy (Verified 12/10/18 10:39)


 NO ASA











Past Medical History





- Social History


Smoking Status: Former Smoker


Family History: Reviewed & Not Pertinent, CAD, CVA, DM, Hyperlipidemia, 

Hypertension, Malignancy, Thyroid Disfunction, Other


Patient has suicidal ideation: No


Patient has homicidal ideation: No





- Past Medical History


Cardiac Medical History: Reports: Hx Atrial Fibrillation, Hx Heart Attack - A-

FIB, Hx Hypercholesterolemia, Hx Hypertension


   Denies: Hx Coronary Artery Disease


Pulmonary Medical History: Reports: Hx Asthma, Hx Bronchitis


   Denies: Hx COPD, Hx Pneumonia


Neurological Medical History: Denies: Hx Cerebrovascular Accident, Hx Seizures


Endocrine Medical History: Reports: Hx Diabetes Mellitus Type 2, Hx 

Hypothyroidism


Renal/ Medical History: Denies: Hx Peritoneal Dialysis


GI Medical History: Reports: Hx Gastritis, Hx Gastroesophageal Reflux Disease


Musculoskeletal Medical History: Reports Hx Arthritis, Reports Hx 

Musculoskeletal Trauma


Psychiatric Medical History: Reports: Hx Anxiety, Hx Depression, Hx 

Schizoaffective Disorder, Hx Schizophrenia - schizo affective


Past Surgical History: Reports: Hx Cholecystectomy, Hx Tubal Ligation.  Denies: 

Hx Hysterectomy





- Immunizations


Immunizations up to date: Yes


Hx Diphtheria, Pertussis, Tetanus Vaccination: Yes - 2013





Review of Systems





- Review of Systems


Constitutional: No symptoms reported


EENT: No symptoms reported


Cardiovascular: No symptoms reported


Respiratory: No symptoms reported


Gastrointestinal: No symptoms reported


Genitourinary: No symptoms reported


Female Genitourinary: No symptoms reported


Musculoskeletal: No symptoms reported


Skin: No symptoms reported


Hematologic/Lymphatic: No symptoms reported


Neurological/Psychological: Headaches


-: Yes All other systems reviewed and negative





Physical Exam





- Vital signs


Vitals: 


 











Temp Pulse Resp BP Pulse Ox


 


 97.4 F   90   16   127/67 H  99 


 


 12/10/18 10:42  12/10/18 10:42  12/10/18 10:42  12/10/18 10:42  12/10/18 10:42











Interpretation: Normal





- General


General appearance: Appears well, Alert





- HEENT


Head: Normocephalic, Atraumatic


Eyes: Normal


Conjunctiva: Normal


Cornea: Normal


Extraocular movements intact: Yes


Eyelashes: Normal


Pupils: PERRL


Neck: Normal





- Respiratory


Respiratory status: No respiratory distress


Chest status: Nontender


Breath sounds: Normal


Chest palpation: Normal





- Cardiovascular


Rhythm: Regular


Heart sounds: Normal auscultation


Murmur: No





- Abdominal


Inspection: Normal


Distension: No distension


Bowel sounds: Normal


Tenderness: Nontender


Organomegaly: No organomegaly





- Back


Back: Normal, Nontender





- Extremities


General upper extremity: Normal inspection, Nontender, Normal color, Normal ROM

, Normal temperature


General lower extremity: Normal inspection, Nontender, Normal color, Normal ROM

, Normal temperature, Normal weight bearing.  No: Eliane's sign





- Neurological


Neuro grossly intact: Yes


Cognition: Normal


Orientation: AAOx4


[place] Coma Scale Eye Opening: Spontaneous


[place] Coma Scale Verbal: Oriented


[place] Coma Scale Motor: Obeys Commands


[place] Coma Scale Total: 15


Speech: Normal


Motor strength normal: LUE, RUE, LLE, RLE


Sensory: Normal





- Psychological


Associated symptoms: Normal affect, Normal mood





- Skin


Skin Temperature: Warm


Skin Moisture: Dry


Skin Color: Normal





Course





- Re-evaluation


Re-evalutation: 





12/10/18 20:27


The patient presents with headache without signs of CNS bleed, stroke, infection

, or other serious etiology. The patient is neurologically intact. Given the 

extremely low risk of these diagnoses further testing and evaluation for these 

possibilities does not appear to be indicated at this time. The patient has 

been instructed to return if the symptoms worsen or change in any way.. Sinus 

disease seen on CT scan we will start the patient on Z-Brenden encouraged patient 

to continue with Tylenol Motrin Compazine Zofran therapy for headache to follow-

up with her primary care physician in 5-7 days for reevaluation.





- Vital Signs


Vital signs: 


 











Temp Pulse Resp BP Pulse Ox


 


 98.2 F   78   18   119/68   98 


 


 12/10/18 14:05  12/10/18 14:05  12/10/18 14:05  12/10/18 14:05  12/10/18 14:05














Discharge





- Discharge


Clinical Impression: 


Sinusitis


Qualifiers:


 Chronicity: unspecified 





Headache


Qualifiers:


 Headache type: unspecified Headache chronicity pattern: unspecified pattern 

Intractability: not intractable Qualified Code(s): R51 - Headache





Condition: Good


Disposition: HOME, SELF-CARE


Instructions:  Headache ([facility]), Sinusitis ([facility])


Additional Instructions: 


CAT scan shows signs of sinus disease we will start you on antibiotic 

azithromycin.  I will highly recommend she follow-up with your primary care 

physician approximate 5-7 days.  Take the Compazine Zofran as provided to see 

if this would aid in your headache you may also take over-the-counter anti-

inflammatories along with Tylenol.  Return to ER symptoms worsen.


Prescriptions: 


Azithromycin [Zithromax] 250 mg PO DAILY #4 tablet


Ondansetron HCl [Zofran 4 mg Tablet] 1 - 2 tab PO Q6 #30 tablet


Prochlorperazine Maleate [Compazine] 5 mg PO Q6 #30 tablet


Referrals: 


SCOOTER REZA MD [Primary Care Provider] - Follow up in 3-5 days

## 2018-12-14 NOTE — ER DOCUMENT REPORT
HPI





- HPI


Time Seen by Provider: 12/14/18 18:19


Pain Level: 5


Notes: 





Patient is a 52-year-old female with a history of hypertension, type 2 diabetes

, hypothyroidism, anxiety, depression, migraine headaches, A. fib (on Eliquis) 

who presents to the ED complaining of a continued left-sided frontal headache 

that will wrap around to the right side of the forehead x1mo. patient states 

that she did have a fall initially in the grass and hit the left side of her 

head which precipitated the headache.  Patient states that this headache does 

mimic previous migraine headaches, but is lasting longer than usual.  Patient 

states that the headache is the same as it has been since initial onset.  

Patient states that pushing on her head does make the pain worse.  She is still 

eating and drinking without any difficulties.  She is urinating normally and 

having normal bowel movements.  Patient is accompanied by her significant other 

who states that she is otherwise at baseline with mentation, speech, and 

behavior.  Patient states that she was here 4 days ago as well and did have 

some improvement in her headache, but was placed on antibiotic for sinus 

infection which she does not believe that she had.  Patient states that she has 

not been able to get in with her primary care doctor so she presented here as 

her Topamax has not been working well for her.  She has no other concerns or 

complaints.  Denies any fever, head injury, neck stiffness, changes in vision/

speech/mentation/hearing, URI, sore throat, chest pain, palpitations, syncope, 

cough, shortness of breath, wheeze, dyspnea, abdominal pain, nausea/vomiting/

diarrhea, urinary retention, dysuria, hematuria, loss of control of bowel or 

bladder, numbness/tingling, saddle anesthesia, muscle paralysis/weakness, or 

rash.





- ROS


Systems Reviewed and Negative: Yes All other systems reviewed and negative





- REPRODUCTIVE


Reproductive: DENIES: Pregnant:





- DERM


Skin Color: Normal





Past Medical History





- Social History


Smoking Status: Never Smoker


Frequency of alcohol use: None


Drug Abuse: None


Family History: Reviewed & Not Pertinent, CAD, CVA, DM, Hyperlipidemia, 

Hypertension, Malignancy, Thyroid Disfunction, Other


Patient has suicidal ideation: No


Patient has homicidal ideation: No





- Past Medical History


Cardiac Medical History: Reports: Hx Atrial Fibrillation, Hx Heart Attack - A-

FIB, Hx Hypercholesterolemia, Hx Hypertension


   Denies: Hx Coronary Artery Disease


Pulmonary Medical History: Reports: Hx Asthma, Hx Bronchitis


   Denies: Hx COPD, Hx Pneumonia


Neurological Medical History: Reports: Hx Migraine.  Denies: Hx Cerebrovascular 

Accident, Hx Seizures


Endocrine Medical History: Reports: Hx Diabetes Mellitus Type 2, Hx 

Hypothyroidism


Renal/ Medical History: Denies: Hx Peritoneal Dialysis


GI Medical History: Reports: Hx Gastritis, Hx Gastroesophageal Reflux Disease


Musculoskeletal Medical History: Reports Hx Arthritis, Reports Hx 

Musculoskeletal Trauma


Psychiatric Medical History: Reports: Hx Anxiety, Hx Depression, Hx 

Schizoaffective Disorder, Hx Schizophrenia - schizo affective


Past Surgical History: Reports: Hx Cholecystectomy, Hx Tubal Ligation.  Denies: 

Hx Hysterectomy





- Immunizations


Immunizations up to date: Yes


Hx Diphtheria, Pertussis, Tetanus Vaccination: Yes - 2013





Vertical Provider Document





- CONSTITUTIONAL


Agree With Documented VS: Yes


Notes: 





PHYSICAL EXAMINATION:  





GENERAL: Well-appearing, well-nourished and in no acute distress.  A&Ox4.  

Answers questions appropriately.





HEAD: Atraumatic, normocephalic.  + mild tenderness to the temporalis muscles b/

l and to the forehead w/o ecchymosis, hematoma, or step-offs.





EYES: Pupils equal round and reactive to light, extraocular movements intact, 

sclera anicteric, conjunctiva are normal.  No nystagmus.  vis fields intact.





ENT: EAC clear b/l.  TM's intact b/l without erythema, fluid, or perforation.  

Nares patent and without discharge.  oropharynx clear without exudates.  No 

tonsilar hypertrophy or erythema.  Moist mucous membranes.  No sinus 

tenderness. 





NECK: Normal range of motion, supple without lymphadenopathy.  No rigidity/

meningismus.  No midline tenderness. 





LUNGS: Breath sounds clear to auscultation bilaterally and equal.  No wheezes 

rales or rhonchi.





HEART: Regular rate and rhythm without murmurs, rubs, gallops.





ABDOMEN: Soft, nontender, nondistended abdomen.  No guarding, no rebound.  

Normal bowel sounds present.  No CVA tenderness bilaterally.  





Musculoskeletal: Ext's b/l:  FROM to passive/active. Strength 5+/5.  No 

deficits noted.  No bony tenderness of extremities.





Extremities:  No cyanosis, clubbing, or edema b/l.  Peripheral pulses 2+.  

Capillary refill less than 2 seconds.





NEUROLOGICAL: NIH 0.  GCS 15.  Cranial nerves grossly intact.  Normal speech, 

normal gait.  Normal sensory, motor exams.  Reflexes 2+ b/l. KELLY's negative.  

Pronator drift negative. Heel/shin, finger/nose wnl. Rhomberg neg.





PSYCH: Normal mood, normal affect.





SKIN: Warm, Dry, normal turgor, no rashes or lesions noted.





- INFECTION CONTROL


TRAVEL OUTSIDE OF THE [place] IN LAST 30 DAYS: No





Course





- Re-evaluation


Re-evalutation: 





12/14/18 18:55


Patient is currently neurologically intact with acceptable vital signs.  

Patient had a negative CT scan 4 days ago.


Reviewed with Dr. Meredith that another CT scan is not warranted at this time.  

Pt has not had any acute changes since that last visit.





12/14/18 21:25


Patient is an afebrile, well-hydrated, 52-year-old female who presents to the 

ED with a headache, suspect migrainous.  Vitals are acceptable without any 

significant tachycardia, tachypnea, or hypoxia.  PE is otherwise unremarkable 

for any focal neurological deficits.  NIH 0, GCS 15, cranial nerves grossly 

intact.  Patient has had headaches like this in the past.  No labs or imaging 

warranted at this time based on H&P.  Neg CT scan 4 days ago.  Patient was 

given Toradol, Compazine, and benadryl which has resolved her headache.  

Patient states that she is feeling much better and would like to go home.  She 

is nontoxic-appearing and is tolerating p.o. without any difficulties.  Low 

suspicion for any sepsis, acute glaucoma, temporal arteritis, meningitis, 

intracranial hemorrhage, ischemic stroke, or fracture at this time.  Patient is 

aware that this condition can change from initial presentation and that she 

needs to monitor symptoms closely for any acute changes.  Recheck with your PCM/

neurologist in 3-5 days.  Return to the ED with any worsening/concerning 

symptoms otherwise as reviewed in discharge.  Patient is in agreement.








- Vital Signs


Vital signs: 


 











Temp Pulse Resp BP Pulse Ox


 


 98.9 F   85      119/88 H  96 


 


 12/14/18 18:12  12/14/18 18:12     12/14/18 18:12  12/14/18 18:12














Discharge





- Discharge


Clinical Impression: 


Headache


Qualifiers:


 Headache type: unspecified Headache chronicity pattern: acute headache 

Intractability: not intractable Qualified Code(s): R51 - Headache





Condition: Stable


Disposition: HOME, SELF-CARE


Instructions:  Headache ([facility])


Additional Instructions: 


Rest, Ice/cool compress


Tylenol/ibuprofen as needed


Light stretches daily


Strength exercises as able


Moist heat and massage may help


F/u with your PCP in 3-5 days for a recheck


Consider consult(s) with Neurology for ongoing/worsening symptoms





Return to the ED with any worsening symptoms and/or development of fever, 

headache, changes in behavior/mentation/vision/speech, chest pain, palpitations

, syncope, shortness of breath, trouble breathing, abdominal pain, n/v/d, blood 

in stool/urine, loss of control of bowel/bladder, urinary retention, muscle 

weakness/paralysis, saddle anesthesia, numbness/tingling, or other worsening 

symptoms that are concerning to you.


Forms:  Elevated Blood Pressure


Referrals: 


SCOOTER REZA MD [Primary Care Provider] - Follow up in 3-5 days


CARMELLA RICHARD MD [NO LOCAL MD] - Follow up as needed

## 2018-12-14 NOTE — ER DOCUMENT REPORT
ED Medical Screen (RME)





- General


Chief Complaint: Headache


Stated Complaint: HEADACHE


Time Seen by Provider: 12/14/18 18:19


Notes: 





52 years old female who is on Eliquis due to A. fib, presents today with left-

sided headache for the last few days.  Persistent pain nauseous no vomiting.  

Has neck pain but no neck stiffness.





Diffuse paraspinal muscular tenderness noted over the neck.


TRAVEL OUTSIDE OF THE [place] IN LAST 30 DAYS: No





- Related Data


Allergies/Adverse Reactions: 


 





tramadol Allergy (Intermediate, Verified 12/14/18 18:11)


 AMS


Penicillins Allergy (Mild, Verified 12/14/18 18:11)


 rash


Sulfa (Sulfonamide Antibiotics) Allergy (Mild, Verified 12/14/18 18:11)


 rash


aspirin Allergy (Verified 12/14/18 18:11)


 NO ASA











Past Medical History





- Social History


Frequency of alcohol use: None


Drug Abuse: None





- Past Medical History


Cardiac Medical History: Reports: Hx Atrial Fibrillation, Hx Heart Attack - A-

FIB, Hx Hypercholesterolemia, Hx Hypertension


   Denies: Hx Coronary Artery Disease


Pulmonary Medical History: Reports: Hx Asthma, Hx Bronchitis


   Denies: Hx COPD, Hx Pneumonia


Neurological Medical History: Reports: Hx Migraine.  Denies: Hx Cerebrovascular 

Accident, Hx Seizures


Endocrine Medical History: Reports: Hx Diabetes Mellitus Type 2, Hx 

Hypothyroidism


Renal/ Medical History: Denies: Hx Peritoneal Dialysis


GI Medical History: Reports: Hx Gastritis, Hx Gastroesophageal Reflux Disease


Musculoskeltal Medical History: Reports Hx Arthritis, Reports Hx 

Musculoskeletal Trauma


Psychiatric Medical History: Reports: Hx Anxiety, Hx Depression, Hx 

Schizoaffective Disorder, Hx Schizophrenia - schizo affective


Past Surgical History: Reports: Hx Cholecystectomy, Hx Tubal Ligation.  Denies: 

Hx Hysterectomy





- Immunizations


Immunizations up to date: Yes


Hx Diphtheria, Pertussis, Tetanus Vaccination: Yes - 2013


History of Influenza Vaccine for 10/2017 - 3/2018 Season: Refused





Physical Exam





- Vital signs


Vitals: 





 











Temp Pulse BP Pulse Ox


 


 98.9 F   85   119/88 H  96 


 


 12/14/18 18:12  12/14/18 18:12  12/14/18 18:12  12/14/18 18:12














Course





- Vital Signs


Vital signs: 





 











Temp Pulse Resp BP Pulse Ox


 


 98.9 F   85      119/88 H  96 


 


 12/14/18 18:12  12/14/18 18:12     12/14/18 18:12  12/14/18 18:12














Doctor's Discharge





- Discharge


Referrals: 


SCOOTER REZA MD [Primary Care Provider] - Follow up as needed

## 2019-08-31 NOTE — ER DOCUMENT REPORT
ED Medical Screen (RME)





- General


Mode of Arrival: Ambulatory


Information source: Patient


TRAVEL OUTSIDE OF THE [place] IN LAST 30 DAYS: No





- General


Chief Complaint: Abdominal Pain


Stated Complaint: ABDOMINAL PAIN


Time Seen by Provider: 10/21/17 15:19


Notes: 





Patient is a 51-year-old female presented emergency department for abdominal 

pain.  Patient's pain is still present for the past 2-3 days and is located in 

her left upper and lower quadrants.  Patient states that she is also has some 

nausea and green diarrhea. Patient denies any vomiting, fever, hematemesis, or 

bloody stools.  Patient states she previously had this abdominal pain one time 

in the past and received CT scan; this was 3-5 months ago.  Patient has a 

history of a tubal ligation and a cholecystectomy.  Patient denies any history 

of diverticulitis. (NATACHA YOU)





- Related Data


Allergies/Adverse Reactions: 


 





tramadol Allergy (Intermediate, Verified 10/21/17 14:51)


 AMS


Penicillins Allergy (Mild, Verified 10/21/17 14:51)


 rash


Sulfa (Sulfonamide Antibiotics) Allergy (Mild, Verified 10/21/17 14:51)


 rash


aspirin Allergy (Verified 10/21/17 14:51)


 











Past Medical History





- Social History


Chew tobacco use (# tins/day): No


Frequency of alcohol use: None


Drug Abuse: None





- Past Medical History


Cardiac Medical History: Reports: Hx Atrial Fibrillation, Hx 

Hypercholesterolemia


   Denies: Hx Coronary Artery Disease, Hx Heart Attack, Hx Hypertension


Pulmonary Medical History: Reports: Hx Asthma, Hx Bronchitis


   Denies: Hx COPD, Hx Pneumonia


Neurological Medical History: Denies: Hx Cerebrovascular Accident, Hx Seizures


Endocrine Medical History: Reports: Hx Diabetes Mellitus Type 2 - borderline, 

Hx Hypothyroidism


Renal/ Medical History: Denies: Hx Peritoneal Dialysis


GI Medical History: Reports: Hx Gastritis, Hx Gastroesophageal Reflux Disease


Musculoskeltal Medical History: Reports Hx Arthritis, Reports Hx 

Musculoskeletal Trauma


Psychiatric Medical History: Reports: Hx Anxiety, Hx Depression - anxiety, Hx 

Schizophrenia


Past Surgical History: Reports: Hx Cholecystectomy, Hx Tubal Ligation.  Denies: 

Hx Hysterectomy





- Immunizations


Immunizations up to date: Yes


Hx Diphtheria, Pertussis, Tetanus Vaccination: Yes - 2013





Physical Exam





- Vital signs


Vitals: 





 











Temp Pulse Resp BP Pulse Ox


 


 97.7 F   97   16   129/82 H  96 


 


 10/21/17 14:49  10/21/17 14:49  10/21/17 14:49  10/21/17 14:49  10/21/17 14:49














- Notes


Notes: 





GENERAL: Alert, interacts well. No acute distress.


LUNGS: Clear to auscultation bilaterally, no wheezes, rales, or rhonchi. No 

respiratory distress.


HEART: Regular rate and rhythm. No murmurs, gallops, or rubs.


ABDOMEN: Soft, tenderness to palpation over the epigastric, LLQ, and LUQ; pain 

is mostly located in the LUQ. Non-distended. Bowel sounds present in all 4 

quadrants. (NATACHA YOU)





- Vital Signs


Vital signs: 





 











Temp Pulse Resp BP Pulse Ox


 


 97.7 F   97   16   129/82 H  96 


 


 10/21/17 14:49  10/21/17 14:49  10/21/17 14:49  10/21/17 14:49  10/21/17 14:49














Scribe Documentation





- Scribe


Written by Scribe:: Natacha You, Sheryl, 10/21/2017 1530


acting as scribe for :: Patti None